# Patient Record
Sex: FEMALE | Race: WHITE | Employment: OTHER | URBAN - METROPOLITAN AREA
[De-identification: names, ages, dates, MRNs, and addresses within clinical notes are randomized per-mention and may not be internally consistent; named-entity substitution may affect disease eponyms.]

---

## 2017-04-12 ENCOUNTER — HOSPITAL ENCOUNTER (OUTPATIENT)
Dept: MAMMOGRAPHY | Age: 52
Discharge: HOME | End: 2017-04-12
Attending: NURSE PRACTITIONER | Admitting: NURSE PRACTITIONER

## 2017-04-12 DIAGNOSIS — Z12.31 ENCOUNTER FOR SCREENING MAMMOGRAM FOR MALIGNANT NEOPLASM OF BREAST: ICD-10-CM

## 2017-04-24 ENCOUNTER — OFFICE VISIT (OUTPATIENT)
Dept: ORTHOPEDIC SURGERY | Age: 52
End: 2017-04-24

## 2017-04-24 VITALS — BODY MASS INDEX: 42.94 KG/M2 | HEIGHT: 69 IN | WEIGHT: 289.9 LBS

## 2017-04-24 DIAGNOSIS — M25.561 PAIN IN BOTH KNEES, UNSPECIFIED CHRONICITY: Primary | ICD-10-CM

## 2017-04-24 DIAGNOSIS — M17.0 PRIMARY OSTEOARTHRITIS OF BOTH KNEES: ICD-10-CM

## 2017-04-24 DIAGNOSIS — M25.562 PAIN IN BOTH KNEES, UNSPECIFIED CHRONICITY: Primary | ICD-10-CM

## 2017-04-24 PROCEDURE — 73562 X-RAY EXAM OF KNEE 3: CPT | Performed by: PHYSICIAN ASSISTANT

## 2017-04-24 PROCEDURE — 20611 DRAIN/INJ JOINT/BURSA W/US: CPT | Performed by: PHYSICIAN ASSISTANT

## 2017-04-24 PROCEDURE — G8427 DOCREV CUR MEDS BY ELIG CLIN: HCPCS | Performed by: PHYSICIAN ASSISTANT

## 2017-04-24 PROCEDURE — 99213 OFFICE O/P EST LOW 20 MIN: CPT | Performed by: PHYSICIAN ASSISTANT

## 2017-04-24 PROCEDURE — 1036F TOBACCO NON-USER: CPT | Performed by: PHYSICIAN ASSISTANT

## 2017-04-24 PROCEDURE — G8417 CALC BMI ABV UP PARAM F/U: HCPCS | Performed by: PHYSICIAN ASSISTANT

## 2017-04-24 PROCEDURE — 3014F SCREEN MAMMO DOC REV: CPT | Performed by: PHYSICIAN ASSISTANT

## 2017-05-11 ENCOUNTER — TELEPHONE (OUTPATIENT)
Dept: ORTHOPEDIC SURGERY | Age: 52
End: 2017-05-11

## 2017-05-12 ENCOUNTER — TELEPHONE (OUTPATIENT)
Dept: ORTHOPEDIC SURGERY | Age: 52
End: 2017-05-12

## 2017-06-01 ENCOUNTER — HOSPITAL ENCOUNTER (OUTPATIENT)
Dept: SURGERY | Age: 52
Discharge: HOME | End: 2017-06-01
Attending: INTERNAL MEDICINE | Admitting: INTERNAL MEDICINE

## 2017-06-01 VITALS
HEIGHT: 68 IN | DIASTOLIC BLOOD PRESSURE: 78 MMHG | TEMPERATURE: 97.5 F | BODY MASS INDEX: 43.04 KG/M2 | SYSTOLIC BLOOD PRESSURE: 132 MMHG | RESPIRATION RATE: 16 BRPM | OXYGEN SATURATION: 98 % | WEIGHT: 284 LBS | HEART RATE: 67 BPM

## 2017-06-01 DIAGNOSIS — Z12.11 ENCOUNTER FOR SCREENING FOR MALIGNANT NEOPLASM OF COLON: ICD-10-CM

## 2017-06-01 LAB — PREGNANCY, URINE: NEGATIVE

## 2017-06-01 RX ORDER — M-VIT,TX,IRON,MINS/CALC/FOLIC 27MG-0.4MG
1 TABLET ORAL DAILY
COMMUNITY

## 2017-06-01 RX ORDER — LIDOCAINE HYDROCHLORIDE 10 MG/ML
0.1 INJECTION, SOLUTION EPIDURAL; INFILTRATION; INTRACAUDAL; PERINEURAL
Status: ACTIVE | OUTPATIENT
Start: 2017-06-01 | End: 2017-06-01

## 2017-06-01 RX ORDER — SODIUM CHLORIDE, SODIUM LACTATE, POTASSIUM CHLORIDE, CALCIUM CHLORIDE 600; 310; 30; 20 MG/100ML; MG/100ML; MG/100ML; MG/100ML
INJECTION, SOLUTION INTRAVENOUS ONCE
Status: COMPLETED | OUTPATIENT
Start: 2017-06-01 | End: 2017-06-01

## 2017-06-01 RX ADMIN — SODIUM CHLORIDE, SODIUM LACTATE, POTASSIUM CHLORIDE, CALCIUM CHLORIDE: 600; 310; 30; 20 INJECTION, SOLUTION INTRAVENOUS at 13:13

## 2017-06-01 ASSESSMENT — PAIN SCALES - GENERAL
PAINLEVEL_OUTOF10: 0

## 2017-06-01 ASSESSMENT — PAIN DESCRIPTION - DESCRIPTORS: DESCRIPTORS: ACHING;SORE

## 2017-06-01 ASSESSMENT — PAIN - FUNCTIONAL ASSESSMENT: PAIN_FUNCTIONAL_ASSESSMENT: 0-10

## 2017-07-10 ENCOUNTER — OFFICE VISIT (OUTPATIENT)
Dept: ORTHOPEDIC SURGERY | Age: 52
End: 2017-07-10

## 2017-07-10 DIAGNOSIS — M17.0 PRIMARY OSTEOARTHRITIS OF BOTH KNEES: Primary | ICD-10-CM

## 2017-07-10 PROCEDURE — 99999 PR OFFICE/OUTPT VISIT,PROCEDURE ONLY: CPT | Performed by: PHYSICIAN ASSISTANT

## 2017-07-10 PROCEDURE — 20611 DRAIN/INJ JOINT/BURSA W/US: CPT | Performed by: PHYSICIAN ASSISTANT

## 2017-07-10 PROCEDURE — 1036F TOBACCO NON-USER: CPT | Performed by: PHYSICIAN ASSISTANT

## 2017-07-17 ENCOUNTER — OFFICE VISIT (OUTPATIENT)
Dept: ORTHOPEDIC SURGERY | Age: 52
End: 2017-07-17

## 2017-07-17 DIAGNOSIS — M17.0 PRIMARY OSTEOARTHRITIS OF BOTH KNEES: Primary | ICD-10-CM

## 2017-07-17 PROCEDURE — 20611 DRAIN/INJ JOINT/BURSA W/US: CPT | Performed by: PHYSICIAN ASSISTANT

## 2017-07-17 PROCEDURE — 1036F TOBACCO NON-USER: CPT | Performed by: PHYSICIAN ASSISTANT

## 2017-07-17 PROCEDURE — 99999 PR OFFICE/OUTPT VISIT,PROCEDURE ONLY: CPT | Performed by: PHYSICIAN ASSISTANT

## 2017-07-24 ENCOUNTER — OFFICE VISIT (OUTPATIENT)
Dept: ORTHOPEDIC SURGERY | Age: 52
End: 2017-07-24

## 2017-07-24 DIAGNOSIS — M17.0 PRIMARY OSTEOARTHRITIS OF BOTH KNEES: Primary | ICD-10-CM

## 2017-07-24 PROCEDURE — 99999 PR OFFICE/OUTPT VISIT,PROCEDURE ONLY: CPT | Performed by: PHYSICIAN ASSISTANT

## 2017-07-24 PROCEDURE — 1036F TOBACCO NON-USER: CPT | Performed by: PHYSICIAN ASSISTANT

## 2017-07-24 PROCEDURE — 20611 DRAIN/INJ JOINT/BURSA W/US: CPT | Performed by: PHYSICIAN ASSISTANT

## 2018-01-29 ENCOUNTER — OFFICE VISIT (OUTPATIENT)
Dept: ORTHOPEDIC SURGERY | Age: 53
End: 2018-01-29

## 2018-01-29 VITALS — WEIGHT: 283.95 LBS | BODY MASS INDEX: 43.04 KG/M2 | HEIGHT: 68 IN

## 2018-01-29 DIAGNOSIS — M17.0 BILATERAL PRIMARY OSTEOARTHRITIS OF KNEE: Primary | ICD-10-CM

## 2018-01-29 PROCEDURE — 3017F COLORECTAL CA SCREEN DOC REV: CPT | Performed by: ORTHOPAEDIC SURGERY

## 2018-01-29 PROCEDURE — G8427 DOCREV CUR MEDS BY ELIG CLIN: HCPCS | Performed by: ORTHOPAEDIC SURGERY

## 2018-01-29 PROCEDURE — 1036F TOBACCO NON-USER: CPT | Performed by: ORTHOPAEDIC SURGERY

## 2018-01-29 PROCEDURE — 3014F SCREEN MAMMO DOC REV: CPT | Performed by: ORTHOPAEDIC SURGERY

## 2018-01-29 PROCEDURE — G8484 FLU IMMUNIZE NO ADMIN: HCPCS | Performed by: ORTHOPAEDIC SURGERY

## 2018-01-29 PROCEDURE — G8417 CALC BMI ABV UP PARAM F/U: HCPCS | Performed by: ORTHOPAEDIC SURGERY

## 2018-01-29 PROCEDURE — 99213 OFFICE O/P EST LOW 20 MIN: CPT | Performed by: ORTHOPAEDIC SURGERY

## 2018-01-29 NOTE — PROGRESS NOTES
lungs every morning, Disp: , Rfl:     IRON, FERROUS SULFATE, PO, Take 1 tablet by mouth daily, Disp: , Rfl:     Multiple Vitamins-Minerals (THERAPEUTIC MULTIVITAMIN-MINERALS) tablet, Take 1 tablet by mouth daily, Disp: , Rfl:     Probiotic Product (PROBIOTIC DAILY PO), Take 1 tablet by mouth daily, Disp: , Rfl:     albuterol sulfate  (90 BASE) MCG/ACT inhaler, Inhale 2 puffs into the lungs, Disp: , Rfl:     Vitamin D (CHOLECALCIFEROL) 1000 UNITS CAPS capsule, Take 1,000 Units by mouth daily. , Disp: , Rfl:     ibuprofen (ADVIL;MOTRIN) 600 MG tablet, Take 600 mg by mouth every 6 hours as needed for Pain., Disp: , Rfl:     cloNIDine (CATAPRES) 0.2 MG tablet, Take 0.5 tablets by mouth 2 times daily. (Patient taking differently: Take  by mouth 2 times daily.), Disp: 90 tablet, Rfl: 0    Topiramate (TOPAMAX PO), Take by mouth Take 75 mg in am and 25 in pm, Disp: , Rfl:     montelukast (SINGULAIR) 10 MG tablet, Take 10 mg by mouth nightly., Disp: , Rfl:     Fexofenadine HCl (ALLEGRA PO), Take 60 mg by mouth daily as needed , Disp: , Rfl:     triamterene-hydrochlorothiazide (MAXZIDE) 75-50 MG per tablet, Take 1 tablet by mouth daily. Taking 1/2 tab daily  , Disp: , Rfl:     ALPRAZolam (XANAX) 0.25 MG tablet, Take 0.5 mg by mouth nightly as needed. , Disp: , Rfl:     cyclobenzaprine (FLEXERIL) 10 MG tablet, Take 5 mg by mouth nightly as needed. , Disp: , Rfl:     fluticasone (FLONASE) 50 MCG/ACT nasal spray, 1 spray by Nasal route daily. , Disp: , Rfl:     vitamin B-12 (CYANOCOBALAMIN) 100 MCG tablet, Take 50 mcg by mouth daily. , Disp: , Rfl:     FLUoxetine HCl (PROZAC PO), Take 80 mg by mouth daily. , Disp: , Rfl:     Levothyroxine Sodium (LEVOXYL PO), Take 50 mcg by mouth daily. Unknown dose, Disp: , Rfl:     albuterol (PROVENTIL HFA;VENTOLIN HFA) 108 (90 BASE) MCG/ACT inhaler, Inhale 2 puffs into the lungs every 6 hours as needed. , Disp: , Rfl:   Allergies:  Ace inhibitors;  Amlodipine;

## 2018-02-13 ENCOUNTER — TELEPHONE (OUTPATIENT)
Dept: ORTHOPEDIC SURGERY | Age: 53
End: 2018-02-13

## 2018-02-14 ENCOUNTER — NURSE ONLY (OUTPATIENT)
Dept: ORTHOPEDIC SURGERY | Age: 53
End: 2018-02-14

## 2018-02-14 DIAGNOSIS — M17.0 PRIMARY OSTEOARTHRITIS OF BOTH KNEES: Primary | ICD-10-CM

## 2018-02-14 PROCEDURE — 20611 DRAIN/INJ JOINT/BURSA W/US: CPT | Performed by: PHYSICIAN ASSISTANT

## 2018-02-14 PROCEDURE — 99999 PR OFFICE/OUTPT VISIT,PROCEDURE ONLY: CPT | Performed by: PHYSICIAN ASSISTANT

## 2018-02-14 NOTE — PROGRESS NOTES
Euflexxa #1 bilateral knee    The patient is symptomatic from bilateral knee osteoarthritis of the knee joint with documented radiological signs of osteoarthritis. The patient has also failed 3 months of conservative treatment including home exercise, education, Tylenol and/or NSAIDs use. The patient was offered a Visco supplementation today. Risks, benefits, and alternatives to the injections were discussed in detail with the patient. The risks discussed included but are not limited to infection, skin reactions, hot swollen joints, and anaphylaxis. The patient gave verbal informed consent for the injection. The patient's skin was prepped with  3 sterile gauze  pads soaked with alcohol solution and the knee joint was injected with 2 mL Euflexxa to the Right and LEFT knee intra-articularly under sterile conditions. Technique: Under sterile conditions a Son[employer]ia ultrasound unit with a variable frequency (6.0-15.0 MHz) linear transducer was used to localize the placement of a 22-gauge needle into the knee joint. Findings: Successful needle placement for intra-articular Visco supplementation injection. Final images were taken and saved for permanent record. The patient tolerated the injection reasonably well. The patient was given instructions to ice the kne and avoid strenuous activities for 24-48 hours. The patient was instructed to call the office immediately if there is increased pain, redness, warmth, fever, or chills. We will see the patient back in one week for their second injection.

## 2018-02-21 ENCOUNTER — OFFICE VISIT (OUTPATIENT)
Dept: ORTHOPEDIC SURGERY | Age: 53
End: 2018-02-21

## 2018-02-21 DIAGNOSIS — M17.0 PRIMARY OSTEOARTHRITIS OF BOTH KNEES: Primary | ICD-10-CM

## 2018-02-21 PROCEDURE — 99999 PR OFFICE/OUTPT VISIT,PROCEDURE ONLY: CPT | Performed by: PHYSICIAN ASSISTANT

## 2018-02-21 PROCEDURE — 20611 DRAIN/INJ JOINT/BURSA W/US: CPT | Performed by: PHYSICIAN ASSISTANT

## 2018-02-21 NOTE — PROGRESS NOTES
SITE: #2 Warren Shea LEFT KNEE    MLE:[id number]    [facility]#:[id number]    EXP:12/2018

## 2018-02-28 ENCOUNTER — OFFICE VISIT (OUTPATIENT)
Dept: ORTHOPEDIC SURGERY | Age: 53
End: 2018-02-28

## 2018-02-28 DIAGNOSIS — M17.0 PRIMARY OSTEOARTHRITIS OF BOTH KNEES: Primary | ICD-10-CM

## 2018-02-28 PROCEDURE — 99999 PR OFFICE/OUTPT VISIT,PROCEDURE ONLY: CPT | Performed by: PHYSICIAN ASSISTANT

## 2018-02-28 PROCEDURE — 20611 DRAIN/INJ JOINT/BURSA W/US: CPT | Performed by: PHYSICIAN ASSISTANT

## 2018-02-28 NOTE — PROGRESS NOTES
Ayde Cao #3    [facility]: [id number]    LOT #: [id number]    EXP: 12/10/18    SITE: right and left knee

## 2018-04-13 ENCOUNTER — HOSPITAL ENCOUNTER (OUTPATIENT)
Dept: MAMMOGRAPHY | Age: 53
Discharge: HOME | End: 2018-04-13
Attending: OBSTETRICS & GYNECOLOGY | Admitting: OBSTETRICS & GYNECOLOGY

## 2018-04-13 DIAGNOSIS — Z12.31 ENCOUNTER FOR SCREENING MAMMOGRAM FOR BREAST CANCER: ICD-10-CM

## 2018-07-24 DIAGNOSIS — M17.0 PRIMARY OSTEOARTHRITIS OF BOTH KNEES: Primary | ICD-10-CM

## 2018-08-07 ENCOUNTER — TELEPHONE (OUTPATIENT)
Dept: ORTHOPEDIC SURGERY | Age: 53
End: 2018-08-07

## 2018-08-28 ENCOUNTER — NURSE ONLY (OUTPATIENT)
Dept: ORTHOPEDIC SURGERY | Age: 53
End: 2018-08-28

## 2018-08-28 DIAGNOSIS — M17.0 PRIMARY OSTEOARTHRITIS OF BOTH KNEES: Primary | ICD-10-CM

## 2018-08-28 PROCEDURE — 20611 DRAIN/INJ JOINT/BURSA W/US: CPT | Performed by: PHYSICIAN ASSISTANT

## 2018-09-05 ENCOUNTER — OFFICE VISIT (OUTPATIENT)
Dept: ORTHOPEDIC SURGERY | Age: 53
End: 2018-09-05

## 2018-09-05 DIAGNOSIS — M17.0 PRIMARY OSTEOARTHRITIS OF BOTH KNEES: Primary | ICD-10-CM

## 2018-09-05 PROCEDURE — 20611 DRAIN/INJ JOINT/BURSA W/US: CPT | Performed by: PHYSICIAN ASSISTANT

## 2018-09-05 NOTE — PROGRESS NOTES
Euflexxa #2 bilateral knee  Osteoarthritis bilateral knee    The patient returns today for their second injection for the treatment of Right and LEFT  knee osteoarthritis. The risks, benefits, and complications of the injections were again discussed in detail with the patient. The risks discussed included but are not limited to infection, skin reactions, hot swollen joints, and anaphylaxis. The patient gave verbal informed consent for the injection. The patient skin was prepped with 3 sterile gauze pads soaked with alcohol solution and the knee joint was injected with 2cc Euflexxa Right and Left knee intra-articularly under sterile conditions . Technique: Under sterile conditions a S[employer] ultrasound unit with a variable frequency (6.0-15.0 MHz) linear transducer was used to localize the placement of a 22-gauge needle into the knee joint. Findings: Successful needle placement for intra-articular Visco supplementation injection. Final images were taken and saved for permanent record. The patient tolerated the injection reasonably well. The patient was given instructions to ice the the knee and avoid strenuous activities for 24-48 hours. The patient was instructed to call the office immediately if there is increased pain, redness, warmth, fever, or chills. We will see the patient back in one week for the third injection.

## 2018-09-12 ENCOUNTER — NURSE ONLY (OUTPATIENT)
Dept: ORTHOPEDIC SURGERY | Age: 53
End: 2018-09-12

## 2018-09-12 DIAGNOSIS — M17.0 PRIMARY OSTEOARTHRITIS OF BOTH KNEES: Primary | ICD-10-CM

## 2018-09-12 NOTE — PROGRESS NOTES
Chief Complaint   Patient presents with    Knee Pain     #3 EUFLEXXA ZAIRA KNEES     The patient returns today for their third and final right and left knee injection for osteoarthritis. The risks, benefits, and complications of the injections were again discussed in detail with the patient. The risks discussed include but are not limited to infection, skin reactions, hot swollen joints, and anaphylaxis. The patient gave verbal informed consent for the injection. The patient's skin was prepped with 3 sterile gauze pads soaked with alcohol solution and the knee joint was injected with 2cc Euflexxa intra-articularly under sterile conditions. Technique: Under sterile conditions a Son[employer] Inside ultrasound unit with a variable frequency (6.0-15.0 MHz) linear transducer was used to localize the placement of a 22-gauge needle into the right and left knee joint. Findings: Successful needle placement for intra-articular Visco supplementation injection. Final images were taken and saved for permanent record. The patient tolerated the injection reasonably well. The patient was given instructions to ice of the knee and avoid strenuous activity for 24-48 hours. The patient was instructed to call the office immediately if there is increased pain, redness, warmth, fever, or chills. We will see the patient back on an as-needed basis  from this point. Renu Estrada [facility]    This dictation was performed with a verbal recognition program [facility]) and it was checked for errors. It is possible that there are still dictated errors within this office note. If so, please bring any errors to my attention for an addendum. All efforts were made to ensure that this office note is accurate.

## 2018-10-10 ENCOUNTER — OFFICE VISIT (OUTPATIENT)
Dept: ORTHOPEDIC SURGERY | Age: 53
End: 2018-10-10
Payer: COMMERCIAL

## 2018-10-10 VITALS — WEIGHT: 283.95 LBS | HEIGHT: 68 IN | BODY MASS INDEX: 43.04 KG/M2

## 2018-10-10 DIAGNOSIS — M25.512 LEFT SHOULDER PAIN, UNSPECIFIED CHRONICITY: Primary | ICD-10-CM

## 2018-10-10 DIAGNOSIS — M75.82 ROTATOR CUFF TENDONITIS, LEFT: ICD-10-CM

## 2018-10-10 PROCEDURE — 3017F COLORECTAL CA SCREEN DOC REV: CPT | Performed by: PHYSICIAN ASSISTANT

## 2018-10-10 PROCEDURE — 1036F TOBACCO NON-USER: CPT | Performed by: PHYSICIAN ASSISTANT

## 2018-10-10 PROCEDURE — G8417 CALC BMI ABV UP PARAM F/U: HCPCS | Performed by: PHYSICIAN ASSISTANT

## 2018-10-10 PROCEDURE — 20611 DRAIN/INJ JOINT/BURSA W/US: CPT | Performed by: PHYSICIAN ASSISTANT

## 2018-10-10 PROCEDURE — G8484 FLU IMMUNIZE NO ADMIN: HCPCS | Performed by: PHYSICIAN ASSISTANT

## 2018-10-10 PROCEDURE — 99214 OFFICE O/P EST MOD 30 MIN: CPT | Performed by: PHYSICIAN ASSISTANT

## 2018-10-10 PROCEDURE — G8427 DOCREV CUR MEDS BY ELIG CLIN: HCPCS | Performed by: PHYSICIAN ASSISTANT

## 2019-01-28 DIAGNOSIS — M17.0 PRIMARY OSTEOARTHRITIS OF BOTH KNEES: Primary | ICD-10-CM

## 2019-02-04 ENCOUNTER — OFFICE VISIT (OUTPATIENT)
Dept: ORTHOPEDIC SURGERY | Age: 54
End: 2019-02-04
Payer: COMMERCIAL

## 2019-02-04 DIAGNOSIS — M17.0 PRIMARY OSTEOARTHRITIS OF BOTH KNEES: Primary | ICD-10-CM

## 2019-02-15 ENCOUNTER — TELEPHONE (OUTPATIENT)
Dept: ORTHOPEDIC SURGERY | Age: 54
End: 2019-02-15

## 2019-02-19 ENCOUNTER — TELEPHONE (OUTPATIENT)
Dept: ORTHOPEDIC SURGERY | Age: 54
End: 2019-02-19

## 2019-03-08 ENCOUNTER — TELEPHONE (OUTPATIENT)
Dept: ORTHOPEDIC SURGERY | Age: 54
End: 2019-03-08

## 2019-03-19 ENCOUNTER — NURSE ONLY (OUTPATIENT)
Dept: ORTHOPEDIC SURGERY | Age: 54
End: 2019-03-19
Payer: COMMERCIAL

## 2019-03-19 DIAGNOSIS — M17.0 PRIMARY OSTEOARTHRITIS OF BOTH KNEES: Primary | ICD-10-CM

## 2019-03-19 PROCEDURE — 20610 DRAIN/INJ JOINT/BURSA W/O US: CPT | Performed by: PHYSICIAN ASSISTANT

## 2019-03-19 PROCEDURE — 99213 OFFICE O/P EST LOW 20 MIN: CPT | Performed by: PHYSICIAN ASSISTANT

## 2019-03-26 ENCOUNTER — NURSE ONLY (OUTPATIENT)
Dept: ORTHOPEDIC SURGERY | Age: 54
End: 2019-03-26
Payer: COMMERCIAL

## 2019-03-26 DIAGNOSIS — M17.0 PRIMARY OSTEOARTHRITIS OF BOTH KNEES: Primary | ICD-10-CM

## 2019-04-02 ENCOUNTER — NURSE ONLY (OUTPATIENT)
Dept: ORTHOPEDIC SURGERY | Age: 54
End: 2019-04-02
Payer: COMMERCIAL

## 2019-04-02 DIAGNOSIS — M17.0 PRIMARY OSTEOARTHRITIS OF BOTH KNEES: Primary | ICD-10-CM

## 2019-04-02 PROCEDURE — 99999 PR OFFICE/OUTPT VISIT,PROCEDURE ONLY: CPT | Performed by: PHYSICIAN ASSISTANT

## 2019-04-15 ENCOUNTER — HOSPITAL ENCOUNTER (OUTPATIENT)
Dept: WOMENS IMAGING | Age: 54
Discharge: HOME | End: 2019-04-15
Payer: COMMERCIAL

## 2019-04-15 DIAGNOSIS — Z12.31 ENCOUNTER FOR SCREENING MAMMOGRAM FOR BREAST CANCER: ICD-10-CM

## 2019-04-15 PROCEDURE — 77067 SCR MAMMO BI INCL CAD: CPT

## 2019-07-08 ENCOUNTER — OFFICE VISIT (OUTPATIENT)
Dept: ORTHOPEDIC SURGERY | Age: 54
End: 2019-07-08
Payer: MEDICARE

## 2019-07-08 VITALS — BODY MASS INDEX: 43.04 KG/M2 | HEIGHT: 68 IN | WEIGHT: 283.95 LBS

## 2019-07-08 DIAGNOSIS — M75.82 ROTATOR CUFF TENDONITIS, LEFT: Primary | ICD-10-CM

## 2019-07-08 PROCEDURE — 99213 OFFICE O/P EST LOW 20 MIN: CPT | Performed by: PHYSICIAN ASSISTANT

## 2019-07-08 NOTE — PROGRESS NOTES
Examinations:        Right Upper Extremity:  Examination of the right upper extremity does not show any tenderness, deformity or injury. Range of motion is unremarkable. There is no gross instability. There are no rashes, ulcerations or lesions. Strength and tone are normal.        Orders     Orders Placed This Encounter   Procedures    MRI Shoulder Left WO Contrast     Standing Status:   Future     Standing Expiration Date:   7/7/2020     Scheduling Instructions:      [facility]     Order Specific Question:   Reason for exam:     Answer:   r/o RTC tear         Assessment / Treatment Plan:     1. Left shoulder rotator cuff tendinitis versus tear      Given her persistent symptoms and failure to improve with a cortisone injection and physical therapy, I would like to obtain a MRI of the left shoulder to rule out partial or localized full-thickness tearing of the rotator cuff. She will return to the clinic to review the MRI results. I discussed with her we may need to pursue arthroscopic surgery. We will discuss this further when she returns to review the MRI results. I spent 15 minutes face-to-face with the patient and greater than 50% of that time was spent counseling/coordinating care for the above-stated diagnosis      Kateryna Webster, [facility]    This dictation was performed with a verbal recognition program ([employer]) and it was checked for errors. It is possible that there are still dictated errors within this office note. If so, please bring any errors to my attention for an addendum. All efforts were made to ensure that this office note is accurate.

## 2019-07-09 ENCOUNTER — TELEPHONE (OUTPATIENT)
Dept: ORTHOPEDIC SURGERY | Age: 54
End: 2019-07-09

## 2019-07-24 ENCOUNTER — OFFICE VISIT (OUTPATIENT)
Dept: ORTHOPEDIC SURGERY | Age: 54
End: 2019-07-24
Payer: MEDICARE

## 2019-07-24 VITALS — BODY MASS INDEX: 43.04 KG/M2 | HEIGHT: 68 IN | WEIGHT: 283.95 LBS

## 2019-07-24 DIAGNOSIS — M75.82 ROTATOR CUFF TENDONITIS, LEFT: Primary | ICD-10-CM

## 2019-07-24 PROCEDURE — 99213 OFFICE O/P EST LOW 20 MIN: CPT | Performed by: ORTHOPAEDIC SURGERY

## 2019-08-12 ENCOUNTER — TELEPHONE (OUTPATIENT)
Dept: BARIATRICS/WEIGHT MGMT | Age: 54
End: 2019-08-12

## 2019-08-12 NOTE — TELEPHONE ENCOUNTER
JENNIFER for patient to call back. Patient attended seminar 8/8/19 with Dr. Magali Enrique. She DOES  have BWLS coverage with Medicare/[employer] Medicare (No Req Diet) BMI Form scanned in media.  Thanks

## 2019-08-20 ENCOUNTER — TELEPHONE (OUTPATIENT)
Dept: BARIATRICS/WEIGHT MGMT | Age: 54
End: 2019-08-20

## 2019-08-27 ENCOUNTER — OFFICE VISIT (OUTPATIENT)
Dept: BARIATRICS/WEIGHT MGMT | Age: 54
End: 2019-08-27
Payer: MEDICARE

## 2019-08-27 VITALS
DIASTOLIC BLOOD PRESSURE: 77 MMHG | RESPIRATION RATE: 18 BRPM | HEART RATE: 87 BPM | BODY MASS INDEX: 44.41 KG/M2 | SYSTOLIC BLOOD PRESSURE: 116 MMHG | HEIGHT: 68 IN | WEIGHT: 293 LBS | OXYGEN SATURATION: 98 %

## 2019-08-27 DIAGNOSIS — E78.2 MIXED HYPERLIPIDEMIA: ICD-10-CM

## 2019-08-27 DIAGNOSIS — Z98.84 STATUS POST GASTRIC BANDING: ICD-10-CM

## 2019-08-27 DIAGNOSIS — Z01.818 PRE-OPERATIVE CLEARANCE: ICD-10-CM

## 2019-08-27 DIAGNOSIS — E66.01 MORBID OBESITY WITH BMI OF 45.0-49.9, ADULT (HCC): Primary | ICD-10-CM

## 2019-08-27 DIAGNOSIS — I10 ESSENTIAL HYPERTENSION: ICD-10-CM

## 2019-08-27 PROCEDURE — 99204 OFFICE O/P NEW MOD 45 MIN: CPT | Performed by: SURGERY

## 2019-08-27 RX ORDER — CLONIDINE HYDROCHLORIDE 0.1 MG/1
0.1 TABLET ORAL 2 TIMES DAILY
COMMUNITY

## 2019-08-27 RX ORDER — LIOTHYRONINE SODIUM 5 UG/1
20 TABLET ORAL DAILY
COMMUNITY
End: 2020-12-22 | Stop reason: ALTCHOICE

## 2019-08-27 RX ORDER — BUSPIRONE HYDROCHLORIDE 15 MG/1
15 TABLET ORAL NIGHTLY
COMMUNITY

## 2019-08-27 RX ORDER — ACETAMINOPHEN 500 MG
1000 TABLET ORAL DAILY
COMMUNITY

## 2019-08-27 NOTE — PROGRESS NOTES
and lids are normal. Pupils are equal, round, and reactive to light. Right eye exhibits no discharge. No foreign body present in the right eye. Left eye exhibits no discharge. No foreign body present in the left eye. No scleral icterus. Neck: Trachea normal and normal range of motion. Neck supple. No JVD present. No tracheal tenderness present. Carotid bruit is not present. No rigidity. No tracheal deviation and no edema present. No thyromegaly present. Cardiovascular: Normal rate, regular rhythm, normal heart sounds, intact distal pulses and normal pulses. Pulmonary/Chest: Effort normal and breath sounds normal. No stridor. No respiratory distress. Patient  has no wheezes. Patient has no rales. Patient exhibits no tenderness and no crepitus. Abdominal: Soft. Normal appearance and bowel sounds are normal. Patient exhibits no distension, no abdominal bruit, no ascites and no mass. There is no hepatosplenomegaly. There is no tenderness. There is no rigidity, no rebound, no guarding and no CVA tenderness. No hernia. Hernia confirmed negative in the ventral area. Musculoskeletal: Normal range of motion. Patient exhibits no edema or tenderness. Lymphadenopathy:        Head (right side): No submental, no submandibular, no preauricular, no posterior auricular and no occipital adenopathy present. Head (left side): No submental, no submandibular, no preauricular, no posterior auricular and no occipital adenopathy present. Patient  has no cervical adenopathy. Right: No supraclavicular adenopathy present. Left: No supraclavicular adenopathy present. Neurological: Patient is alert and oriented to person, place, and time. Patient has normal strength. Coordination and gait normal. GCS eye subscore is 4. GCS verbal subscore is 5. GCS motor subscore is 6. Skin: Skin is warm and dry. No abrasion and no rash noted. Patient  is not diaphoretic. No cyanosis or erythema.    Psychiatric: Patient has

## 2019-08-27 NOTE — PROGRESS NOTES
Name_______________________________________Printed:____________________  Date and time of surgery___9/13/19  0800_____________________Arrival Time:__0600 hosp-endo______________   1. Do not eat or drink anything after 12 midnight (or____hours) prior to surgery. This includes no water, chewing gum or mints. Endoscopy patients follow your doctors bowel prep instructions,which may include taking part of prep after midnight. 2. Take the following pills with a small sip of water on the morning of surgery____inhaler prn, clonidine, levothyroxine, cytomel_______________________________________________                  Do not take blood pressure medications ending in pril or sartan the chalo prior to surgery or the morning of surgery_   3. Aspirin, Ibuprofen, Advil, Naproxen, Vitamin E and other Anti-inflammatory products should be stopped for 5 days before surgery or as directed by your physician. 4. Check with your Doctor regarding stopping Plavix, Coumadin,Eliquis, Lovenox,Effient,Pradaxa,Xarelto, Fragmin or other blood thinners and follow their instructions. 5. Do not smoke, and do not drink any alcoholic beverages 24 hours prior to surgery. This includes NA Beer. Refrain from the usage of any recreational drugs. 6. You may brush your teeth and gargle the morning of surgery. DO NOT SWALLOW WATER   7. You MUST make arrangements for a responsible adult to stay on site while you are here and take you home after your surgery. You will not be allowed to leave alone or drive yourself home. It is strongly suggested someone stay with you the first 24 hrs. Your surgery will be cancelled if you do not have a ride home. 8. A parent/legal guardian must accompany a child scheduled for surgery and plan to stay at the hospital until the child is discharged. Please do not bring other children with you.    9. Please wear simple, loose fitting clothing to the hospital.  Do not bring valuables (money, credit cards, checkbooks, etc.) Do not wear any makeup (including no eye makeup) or nail polish on your fingers or toes. 10. DO NOT wear any jewelry or piercings on day of surgery. All body piercing jewelry must be removed. 11. If you have ___dentures, they will be removed before going to the OR; we will provide you a container. If you wear ___contact lenses or ___glasses, they will be removed; please bring a case for them. 12. Please see your family doctor/pediatrician for a history & physical and/or concerning medications. Bring any test results/reports from your physician's office. PCP__________________Phone___________H&P Appt. Date________             13 If you  have a Living Will and Durable Power of  for Healthcare, please bring in a copy. 15. Notify your Surgeon if you develop any illness between now and surgery  time, cough, cold, fever, sore throat, nausea, vomiting, etc.  Please notify your surgeon if you experience dizziness, shortness of breath or blurred vision between now & the time of your surgery             15. DO NOT shave your operative site 96 hours prior to surgery. For face & neck surgery, men may use an electric razor 48 hours prior to surgery. 16. Shower the night before surgery with ___Antibacterial soap ___Hibiclens             17. To provide excellent care visitors will be limited to one in the room at any given time. 18.  Please bring picture ID and insurance card. 19.  Visit our web site for additional information:  [email or web address]              20.During flu season no children under the age of 15 are permitted in the hospital for the safety of all patients.                               21. If you take a long acting insulin in the evening only  take half of your usual  dose the night  before your procedure              22. If you use a c-pap please bring DOS if staying overnight,             23.For your [facility] has a pharmacy on site to fill your prescriptions. 24. If you use oxygen and have a portable tank please bring it  with you the DOS             25. Bring a complete list of all your medications with name and dose include any supplements. 26. Other__________________________________________   *Please call pre admission testing if you any further questions   Lacey Selby [address]    [id number] [facility]  [phone]   [address] [place] [address]       All above information reviewed with patient in person or by phone. Patient verbalizes understanding. All questions and concerns addressed.                                                                                                  Patient/Rep____________________                                                                                                                                    PRE OP INSTRUCTIONS

## 2019-08-28 ENCOUNTER — ANESTHESIA EVENT (OUTPATIENT)
Dept: ENDOSCOPY | Age: 54
End: 2019-08-28
Payer: MEDICARE

## 2019-09-04 ENCOUNTER — HOSPITAL ENCOUNTER (OUTPATIENT)
Dept: GENERAL RADIOLOGY | Age: 54
Discharge: HOME | End: 2019-09-04
Payer: MEDICARE

## 2019-09-04 ENCOUNTER — HOSPITAL ENCOUNTER (OUTPATIENT)
Age: 54
Discharge: HOME | End: 2019-09-04
Payer: MEDICARE

## 2019-09-04 DIAGNOSIS — I10 ESSENTIAL HYPERTENSION: ICD-10-CM

## 2019-09-04 DIAGNOSIS — Z98.84 STATUS POST GASTRIC BANDING: ICD-10-CM

## 2019-09-04 DIAGNOSIS — E78.2 MIXED HYPERLIPIDEMIA: ICD-10-CM

## 2019-09-04 DIAGNOSIS — E66.01 MORBID OBESITY WITH BMI OF 45.0-49.9, ADULT (HCC): ICD-10-CM

## 2019-09-04 DIAGNOSIS — Z98.84 BARIATRIC SURGERY STATUS: ICD-10-CM

## 2019-09-04 DIAGNOSIS — Z01.818 PRE-OPERATIVE CLEARANCE: ICD-10-CM

## 2019-09-04 LAB
A/G RATIO: 1.1 (ref 1.1–2.2)
ALBUMIN SERPL-MCNC: 4.1 G/DL (ref 3.4–5)
ALP BLD-CCNC: 134 U/L (ref 40–129)
ALT SERPL-CCNC: 15 U/L (ref 10–40)
ANION GAP SERPL CALCULATED.3IONS-SCNC: 16 MMOL/L (ref 3–16)
AST SERPL-CCNC: 27 U/L (ref 15–37)
BASOPHILS ABSOLUTE: 0.1 K/UL (ref 0–0.2)
BASOPHILS RELATIVE PERCENT: 1.9 %
BILIRUB SERPL-MCNC: 0.4 MG/DL (ref 0–1)
BUN BLDV-MCNC: 21 MG/DL (ref 7–20)
CALCIUM SERPL-MCNC: 9.7 MG/DL (ref 8.3–10.6)
CHLORIDE BLD-SCNC: 102 MMOL/L (ref 99–110)
CHOLESTEROL, TOTAL: 213 MG/DL (ref 0–199)
CO2: 23 MMOL/L (ref 21–32)
CREAT SERPL-MCNC: 1.1 MG/DL (ref 0.6–1.1)
EOSINOPHILS ABSOLUTE: 0.2 K/UL (ref 0–0.6)
EOSINOPHILS RELATIVE PERCENT: 2.4 %
FOLATE: 15.4 NG/ML (ref 4.78–24.2)
GFR AFRICAN AMERICAN: >60
GFR NON-AFRICAN AMERICAN: 52
GLOBULIN: 3.9 G/DL
GLUCOSE BLD-MCNC: 98 MG/DL (ref 70–99)
HCT VFR BLD CALC: 43.6 % (ref 36–48)
HDLC SERPL-MCNC: 33 MG/DL (ref 40–60)
HEMOGLOBIN: 14.3 G/DL (ref 12–16)
IRON SATURATION: 26 % (ref 15–50)
IRON: 73 UG/DL (ref 37–145)
LDL CHOLESTEROL CALCULATED: 134 MG/DL
LYMPHOCYTES ABSOLUTE: 1.2 K/UL (ref 1–5.1)
LYMPHOCYTES RELATIVE PERCENT: 18.8 %
MCH RBC QN AUTO: 28.5 PG (ref 26–34)
MCHC RBC AUTO-ENTMCNC: 32.7 G/DL (ref 31–36)
MCV RBC AUTO: 87.1 FL (ref 80–100)
MONOCYTES ABSOLUTE: 0.4 K/UL (ref 0–1.3)
MONOCYTES RELATIVE PERCENT: 6 %
NEUTROPHILS ABSOLUTE: 4.7 K/UL (ref 1.7–7.7)
NEUTROPHILS RELATIVE PERCENT: 70.9 %
PDW BLD-RTO: 14.3 % (ref 12.4–15.4)
PLATELET # BLD: 232 K/UL (ref 135–450)
PMV BLD AUTO: 9 FL (ref 5–10.5)
POTASSIUM SERPL-SCNC: 3.7 MMOL/L (ref 3.5–5.1)
RBC # BLD: 5.01 M/UL (ref 4–5.2)
SODIUM BLD-SCNC: 141 MMOL/L (ref 136–145)
TOTAL IRON BINDING CAPACITY: 278 UG/DL (ref 260–445)
TOTAL PROTEIN: 8 G/DL (ref 6.4–8.2)
TRIGL SERPL-MCNC: 228 MG/DL (ref 0–150)
TSH REFLEX: 0.64 UIU/ML (ref 0.27–4.2)
VITAMIN B-12: 945 PG/ML (ref 211–911)
VITAMIN D 25-HYDROXY: 40.7 NG/ML
VLDLC SERPL CALC-MCNC: 46 MG/DL
WBC # BLD: 6.6 K/UL (ref 4–11)

## 2019-09-04 PROCEDURE — 80053 COMPREHEN METABOLIC PANEL: CPT

## 2019-09-04 PROCEDURE — 83540 ASSAY OF IRON: CPT

## 2019-09-04 PROCEDURE — 83036 HEMOGLOBIN GLYCOSYLATED A1C: CPT

## 2019-09-04 PROCEDURE — 84597 ASSAY OF VITAMIN K: CPT

## 2019-09-04 PROCEDURE — 84590 ASSAY OF VITAMIN A: CPT

## 2019-09-04 PROCEDURE — 84443 ASSAY THYROID STIM HORMONE: CPT

## 2019-09-04 PROCEDURE — 82306 VITAMIN D 25 HYDROXY: CPT

## 2019-09-04 PROCEDURE — 36415 COLL VENOUS BLD VENIPUNCTURE: CPT

## 2019-09-04 PROCEDURE — 84425 ASSAY OF VITAMIN B-1: CPT

## 2019-09-04 PROCEDURE — 82746 ASSAY OF FOLIC ACID SERUM: CPT

## 2019-09-04 PROCEDURE — 84446 ASSAY OF VITAMIN E: CPT

## 2019-09-04 PROCEDURE — 80061 LIPID PANEL: CPT

## 2019-09-04 PROCEDURE — 82607 VITAMIN B-12: CPT

## 2019-09-04 PROCEDURE — 83550 IRON BINDING TEST: CPT

## 2019-09-04 PROCEDURE — 74240 X-RAY XM UPR GI TRC 1CNTRST: CPT

## 2019-09-04 PROCEDURE — 85025 COMPLETE CBC W/AUTO DIFF WBC: CPT

## 2019-09-05 LAB
ESTIMATED AVERAGE GLUCOSE: 114 MG/DL
HBA1C MFR BLD: 5.6 %

## 2019-09-06 LAB
ALPHA-TOCOPHEROL: 14.7 MG/L (ref 5.5–18)
GAMMA-TOCOPHEROL: 1.6 MG/L (ref 0–6)
RETINYL PALMITATE: <0.02 MG/L (ref 0–0.1)
VITAMIN A LEVEL: 0.84 MG/L (ref 0.3–1.2)
VITAMIN A, INTERP: NORMAL
VITAMIN B1 WHOLE BLOOD: 85 NMOL/L (ref 70–180)
VITAMIN K1: 3.4 NMOL/L (ref 0.22–4.88)

## 2019-09-13 ENCOUNTER — HOSPITAL ENCOUNTER (OUTPATIENT)
Age: 54
Setting detail: OUTPATIENT SURGERY
Discharge: HOME | End: 2019-09-13
Attending: SURGERY | Admitting: SURGERY
Payer: MEDICARE

## 2019-09-13 ENCOUNTER — ANESTHESIA (OUTPATIENT)
Dept: ENDOSCOPY | Age: 54
End: 2019-09-13
Payer: MEDICARE

## 2019-09-13 VITALS
DIASTOLIC BLOOD PRESSURE: 65 MMHG | SYSTOLIC BLOOD PRESSURE: 129 MMHG | RESPIRATION RATE: 12 BRPM | OXYGEN SATURATION: 98 %

## 2019-09-13 VITALS
SYSTOLIC BLOOD PRESSURE: 105 MMHG | HEART RATE: 57 BPM | DIASTOLIC BLOOD PRESSURE: 85 MMHG | RESPIRATION RATE: 18 BRPM | TEMPERATURE: 98.4 F | BODY MASS INDEX: 44.41 KG/M2 | OXYGEN SATURATION: 96 % | WEIGHT: 293 LBS | HEIGHT: 68 IN

## 2019-09-13 LAB — HCG(URINE) PREGNANCY TEST: NEGATIVE

## 2019-09-13 PROCEDURE — 2500000003 HC RX 250 WO HCPCS: Performed by: NURSE ANESTHETIST, CERTIFIED REGISTERED

## 2019-09-13 PROCEDURE — 7100000001 HC PACU RECOVERY - ADDTL 15 MIN: Performed by: SURGERY

## 2019-09-13 PROCEDURE — 3700000000 HC ANESTHESIA ATTENDED CARE: Performed by: SURGERY

## 2019-09-13 PROCEDURE — 7100000000 HC PACU RECOVERY - FIRST 15 MIN: Performed by: SURGERY

## 2019-09-13 PROCEDURE — 88305 TISSUE EXAM BY PATHOLOGIST: CPT

## 2019-09-13 PROCEDURE — 7100000011 HC PHASE II RECOVERY - ADDTL 15 MIN: Performed by: SURGERY

## 2019-09-13 PROCEDURE — 3609012400 HC EGD TRANSORAL BIOPSY SINGLE/MULTIPLE: Performed by: SURGERY

## 2019-09-13 PROCEDURE — 43239 EGD BIOPSY SINGLE/MULTIPLE: CPT | Performed by: SURGERY

## 2019-09-13 PROCEDURE — 2580000003 HC RX 258: Performed by: ANESTHESIOLOGY

## 2019-09-13 PROCEDURE — 7100000010 HC PHASE II RECOVERY - FIRST 15 MIN: Performed by: SURGERY

## 2019-09-13 PROCEDURE — 6360000002 HC RX W HCPCS: Performed by: NURSE ANESTHETIST, CERTIFIED REGISTERED

## 2019-09-13 PROCEDURE — 84703 CHORIONIC GONADOTROPIN ASSAY: CPT

## 2019-09-13 PROCEDURE — 2709999900 HC NON-CHARGEABLE SUPPLY: Performed by: SURGERY

## 2019-09-13 RX ORDER — LIDOCAINE HYDROCHLORIDE 10 MG/ML
1 INJECTION, SOLUTION EPIDURAL; INFILTRATION; INTRACAUDAL; PERINEURAL
Status: DISCONTINUED | OUTPATIENT
Start: 2019-09-13 | End: 2019-09-13 | Stop reason: HOSPADM

## 2019-09-13 RX ORDER — LIDOCAINE HYDROCHLORIDE 20 MG/ML
INJECTION, SOLUTION EPIDURAL; INFILTRATION; INTRACAUDAL; PERINEURAL PRN
Status: DISCONTINUED | OUTPATIENT
Start: 2019-09-13 | End: 2019-09-13 | Stop reason: SDUPTHER

## 2019-09-13 RX ORDER — PROPOFOL 10 MG/ML
INJECTION, EMULSION INTRAVENOUS PRN
Status: DISCONTINUED | OUTPATIENT
Start: 2019-09-13 | End: 2019-09-13 | Stop reason: SDUPTHER

## 2019-09-13 RX ORDER — SODIUM CHLORIDE 0.9 % (FLUSH) 0.9 %
10 SYRINGE (ML) INJECTION EVERY 12 HOURS SCHEDULED
Status: DISCONTINUED | OUTPATIENT
Start: 2019-09-13 | End: 2019-09-13 | Stop reason: HOSPADM

## 2019-09-13 RX ORDER — SODIUM CHLORIDE 9 MG/ML
INJECTION, SOLUTION INTRAVENOUS CONTINUOUS
Status: DISCONTINUED | OUTPATIENT
Start: 2019-09-13 | End: 2019-09-13 | Stop reason: HOSPADM

## 2019-09-13 RX ORDER — SODIUM CHLORIDE 0.9 % (FLUSH) 0.9 %
10 SYRINGE (ML) INJECTION PRN
Status: DISCONTINUED | OUTPATIENT
Start: 2019-09-13 | End: 2019-09-13 | Stop reason: HOSPADM

## 2019-09-13 RX ORDER — OMEPRAZOLE 20 MG/1
20 CAPSULE, DELAYED RELEASE ORAL DAILY
Qty: 30 CAPSULE | Refills: 3 | Status: SHIPPED | OUTPATIENT
Start: 2019-09-13 | End: 2020-12-22 | Stop reason: ALTCHOICE

## 2019-09-13 RX ADMIN — SODIUM CHLORIDE: 9 INJECTION, SOLUTION INTRAVENOUS at 06:50

## 2019-09-13 RX ADMIN — PROPOFOL 40 MG: 10 INJECTION, EMULSION INTRAVENOUS at 08:26

## 2019-09-13 RX ADMIN — PROPOFOL 30 MG: 10 INJECTION, EMULSION INTRAVENOUS at 08:28

## 2019-09-13 RX ADMIN — PROPOFOL 80 MG: 10 INJECTION, EMULSION INTRAVENOUS at 08:23

## 2019-09-13 RX ADMIN — LIDOCAINE HYDROCHLORIDE 100 MG: 20 INJECTION, SOLUTION EPIDURAL; INFILTRATION; INTRACAUDAL; PERINEURAL at 08:23

## 2019-09-13 RX ADMIN — PROPOFOL 40 MG: 10 INJECTION, EMULSION INTRAVENOUS at 08:24

## 2019-09-13 RX ADMIN — PROPOFOL 40 MG: 10 INJECTION, EMULSION INTRAVENOUS at 08:25

## 2019-09-13 ASSESSMENT — COPD QUESTIONNAIRES: CAT_SEVERITY: MODERATE

## 2019-09-13 NOTE — PROGRESS NOTES
Pt arrived from endo to PACU bay 3. Report received from endo staff. Pt arouses easily to voice. Pt on RA, NSR, VSS. Will continue to monitor.

## 2019-09-13 NOTE — ANESTHESIA PRE PROCEDURE
 H/O arthroscopy of knee Z98.890    Rotator cuff tendonitis, left M75.82    Mixed hyperlipidemia E78.2    Pre-operative clearance Z01.818    Status post gastric banding Z98.84    Morbid obesity with BMI of 45.0-49.9, adult ([facility]) E66.01, Z68.42       Past Medical History:        Diagnosis Date    Anxiety     Arthritis     Asthma     Fibromyalgia     Hypertension     Migraine     Primary osteoarthritis of both knees     Sleep apnea     uses CPAP    Thyroid disease        Past Surgical History:        Procedure Laterality Date    CHOLECYSTECTOMY  10/14/2014    Open cholecystectomy and hepatico jejunostomy, estela-en-y    COLONOSCOPY  06/01/2017    polyp    ENDOMETRIAL ABLATION  02/2013    KNEE ARTHROSCOPY Left 2/23/16    lateral menisectomoy chondroplasty synovectomy    LAP BAND      THYROIDECTOMY, PARTIAL         Social History:    Social History     Tobacco Use    Smoking status: Never Smoker    Smokeless tobacco: Never Used   Substance Use Topics    Alcohol use:  Yes     Alcohol/week: 1.0 standard drinks     Types: 1 Glasses of wine per week     Comment: socially/ rare                                Counseling given: Not Answered      Vital Signs (Current):   Vitals:    08/27/19 1440 09/13/19 0636   BP:  115/67   Pulse:  64   Resp:  14   Temp:  99.1 °F (37.3 °C)   TempSrc:  Temporal   SpO2:  95%   Weight: (!) 313 lb (142 kg) (!) 300 lb 14.4 oz (136.5 kg)   Height: 5' 8\" (1.727 m) 5' 8\" (1.727 m)                                              BP Readings from Last 3 Encounters:   09/13/19 115/67   08/27/19 116/77   06/01/17 132/78       NPO Status: Time of last liquid consumption: 2200                        Time of last solid consumption: 2100                        Date of last liquid consumption: 09/12/19                        Date of last solid food consumption: 09/11/19    BMI:   Wt Readings from Last 3 Encounters:   09/13/19 (!) 300 lb 14.4 oz (136.5 kg)   08/27/19 (!) 313 lb (142 kg)

## 2019-09-13 NOTE — OP NOTE
[facility]) Physicians   [facility]  [address] [place] [address], [address] [place] [id number] . Phone: [phone]  Fax: [phone]         Esophagogastroduodenoscopy     Indications: s/p lap band , now with symptoms of Dysphagia ,  nausea and vomiting. Pre-operative Diagnosis: s/p lap band, rule out erosion or slippage . Post-operative Diagnosis: same. + normal band position     Surgeon: Marcelle Zaldivar MD    Anesthesia: General endotracheal anesthesia    ASA Class: 3    Procedure Details   The patient was seen in the Holding Room. The risks, benefits, complications, treatment options, and expected outcomes were discussed with the patient. Pre-op Endoscopy recommended to rule any intragastric pathology that would interfere with proposed procedure /  And or due to current conditions. The possibilities of reaction to medication, pulmonary aspiration, perforation of viscus, bleeding, recurrent infection, the need for additional procedures, failure to diagnose a condition, and creating a complication requiring transfusion or operation were discussed with the patient. The patient concurred with the proposed plan, giving informed consent. The site of surgery properly noted/marked. The patient was taken to Endoscopy Suite, identified as Yola Bobo and the procedure verified as  Esophagogastroduodenoscopy  A Time Out was held and the above information confirmed. Upper Endoscopy: An endoscope was inserted through the oropharynx into the upper esophagus. The endoscope was passed into the stomach to the level of the pylorus and passed to the duodenum where the ampulla was visualized and duodenum was normal. Then the scope was retracted back to the stomach and it was abnormal , mild antral gastritis biopsy of the antrum obtained, then retroflexed and the gastroesophageal junction was inspected . There was no hiatal hernia, no clear evidence of Chavarria's.   No band slippage noted nor erosion,

## 2019-09-19 ENCOUNTER — OFFICE VISIT (OUTPATIENT)
Dept: BARIATRICS/WEIGHT MGMT | Age: 54
End: 2019-09-19
Payer: MEDICARE

## 2019-09-19 VITALS
SYSTOLIC BLOOD PRESSURE: 117 MMHG | WEIGHT: 293 LBS | HEART RATE: 68 BPM | DIASTOLIC BLOOD PRESSURE: 77 MMHG | OXYGEN SATURATION: 98 % | HEIGHT: 68 IN | RESPIRATION RATE: 18 BRPM | BODY MASS INDEX: 44.41 KG/M2

## 2019-09-19 DIAGNOSIS — Z98.84 STATUS POST GASTRIC BANDING: ICD-10-CM

## 2019-09-19 DIAGNOSIS — E66.01 MORBID OBESITY WITH BMI OF 45.0-49.9, ADULT (HCC): Primary | ICD-10-CM

## 2019-09-19 DIAGNOSIS — E78.2 MIXED HYPERLIPIDEMIA: ICD-10-CM

## 2019-09-19 DIAGNOSIS — I10 ESSENTIAL HYPERTENSION: ICD-10-CM

## 2019-09-19 PROCEDURE — 43999 UNLISTED PROCEDURE STOMACH: CPT | Performed by: SURGERY

## 2019-09-19 NOTE — PROGRESS NOTES
[facility]) Physicians   [employer] [facility] [employer]  Chetan Blackmon MD, [facility], [address] [place] [address]    [place]  [address] . Phone: [phone]  Fax: [phone]          Chief Complaint   Patient presents with    Obesity     f/u band move to [facility]           HPI:     Colletta Fells is a very pleasant 49 y.o.  female , Body mass index is 46.91 kg/m². [place] And multiple medical problems who is presenting for bariatric follow up care. Eliane Sagastume is s/p laparoscopic gastric band . Comes today to the clinic without any complaints. Patient denies any nausea, vomiting, fevers, chills, shortness of breath, chest pain, constipation or urinary symptoms. Denies any heartburn nor dysphagia. Eliane Sagastume is feeling very well, and is very active. Patient is very pleased with the weight loss and resolution of co-morbid conditions. Pain Assessment   Denies any abdominal pain     Past Medical History:   Diagnosis Date    Anxiety     Arthritis     Asthma     Fibromyalgia     Hypertension     Migraine     Primary osteoarthritis of both knees     Sleep apnea     uses CPAP    Thyroid disease      Past Surgical History:   Procedure Laterality Date    CHOLECYSTECTOMY  10/14/2014    Open cholecystectomy and hepatico jejunostomy, estela-en-y    COLONOSCOPY  06/01/2017    polyp    ENDOMETRIAL ABLATION  02/2013    KNEE ARTHROSCOPY Left 2/23/16    lateral menisectomoy chondroplasty synovectomy    LAP BAND      THYROIDECTOMY, PARTIAL      UPPER GASTROINTESTINAL ENDOSCOPY N/A 9/13/2019    EGD BIOPSY performed by Son May MD at [address] [place] [address]     Family History   Problem Relation Age of Onset    Heart Disease Mother     Cancer Mother     Heart Disease Maternal Grandfather     Heart Disease Father     Stroke Father      Social History     Tobacco Use    Smoking status: Never Smoker    Smokeless tobacco: Never Used   Substance Use Topics    Alcohol use:  Yes     Alcohol/week: 1.0 standard drinks     Types: 1 Inhale 2 puffs into the lungs every 6 hours as needed. , Disp: , Rfl:       Review of Systems - History obtained from the patient  General ROS: negative  Psychological ROS: negative  Ophthalmic ROS: negative  Neurological ROS: negative  ENT ROS: negative  Allergy and Immunology ROS: negative  Hematological and Lymphatic ROS: negative  Endocrine ROS: negative  Breast ROS: negative  Respiratory ROS: negative  Cardiovascular ROS: negative  Gastrointestinal ROS:negative  Genito-Urinary ROS: negative  Musculoskeletal ROS: negative   Skin ROS: negative    Physical Exam   Vitals Reviewed   Constitutional: Patient is oriented to person, place, and time. Patient appears well-developed and well-nourished. Patient is active and cooperative. Non-toxic appearance. No distress. HENT:   Head: Normocephalic and atraumatic. Head is without abrasion and without laceration. Hair is normal.   Right Ear: External ear normal. No lacerations. No drainage, swelling . Left Ear: External ear normal. No lacerations. No drainage, swelling. Nose: Nose normal. No nose lacerations or nasal deformity. Eyes: Conjunctivae, EOM and lids are normal. Right eye exhibits no discharge. No foreign body present in the right eye. Left eye exhibits no discharge. No foreign body present in the left eye. No scleral icterus. Neck: Trachea normal and normal range of motion. No JVD present. Pulmonary/Chest: Effort normal. No accessory muscle usage or stridor. No apnea. No respiratory distress. Cardiovascular: Normal rate and no JVD. Abdominal: Normal appearance. Patient exhibits no distension. Abdomen is soft, non tender. Musculoskeletal: Normal range of motion. Patient exhibits no edema. Neurological: Patient is alert and oriented to person, place, and time. Patient has normal strength. GCS eye subscore is 4. GCS verbal subscore is 5. GCS motor subscore is 6. Skin: Skin is warm and dry. No abrasion and no rash noted.  Patient is not counseling, answering questions, addressing concerns, reviewing labs and/or other studies with the patient as well as coordinating care. Neha Esteban is starting to make better dietary choices. We discussed her normal EGD. She will start MWL. The patient is having increased hunger and feeling decreased restriction. she will need a fill today. The port was identified by palpation and needle aspiration to confirm needle placement in reservoir. The fluid aspirated and injected easily. 1ccs were added to the band for a total of 7. The patient was given dietary instructions and was able to tolerate water before leaving the office. The patient was instructed to call for any problems. Neha Esteban will benefit from seeing our Behaviorist to work on behavioral aspects / changes to help with weight loss. RTC in 1 months  Healthy Diet and Exercise          Patient advised that its their responsibility to follow up for studies, referrals and/or labs ordered today.

## 2019-09-24 ENCOUNTER — OFFICE VISIT (OUTPATIENT)
Dept: BARIATRICS/WEIGHT MGMT | Age: 54
End: 2019-09-24
Payer: MEDICARE

## 2019-09-24 ENCOUNTER — TELEPHONE (OUTPATIENT)
Dept: BARIATRICS/WEIGHT MGMT | Age: 54
End: 2019-09-24

## 2019-09-24 VITALS
OXYGEN SATURATION: 98 % | DIASTOLIC BLOOD PRESSURE: 88 MMHG | BODY MASS INDEX: 44.41 KG/M2 | SYSTOLIC BLOOD PRESSURE: 139 MMHG | RESPIRATION RATE: 18 BRPM | WEIGHT: 293 LBS | HEART RATE: 68 BPM | HEIGHT: 68 IN

## 2019-09-24 DIAGNOSIS — Z98.84 STATUS POST GASTRIC BANDING: Primary | ICD-10-CM

## 2019-09-24 PROCEDURE — 43999 UNLISTED PROCEDURE STOMACH: CPT | Performed by: SURGERY

## 2019-09-24 NOTE — TELEPHONE ENCOUNTER
Marilia Lennon was seen on 9/19/19 by Dr. Grant Ruano for a band fill. States that she is having horrible heart burn and cannot sleep. Feels like she needs the fluid taken back out.   Please call [phone]

## 2019-09-24 NOTE — PROGRESS NOTES
Since last fill, having issues with nausea and vomiting ( we added 1 cc total of 7 now in band)   Band will be unfilled today and let her stomach rest and pt will continue with MWL. The port was identified by palpation and needle aspiration to confirm needle placement in reservoir. The fluid aspirated and injected easily. 7 ccs were total removed. The patient was given dietary instructions and was able to tolerate water before leaving the office. The patient was instructed to call for any problems.

## 2019-09-24 NOTE — TELEPHONE ENCOUNTER
Spoke with patient, she has had some vomiting,   Band feels to tight as well as pressure when she is laying down at night. Will speak with Dr. Rolando Montejo see if we can do an unfill ASAP.

## 2019-09-26 PROBLEM — Z01.818 PRE-OPERATIVE CLEARANCE: Status: RESOLVED | Noted: 2019-08-27 | Resolved: 2019-09-26

## 2019-10-02 DIAGNOSIS — M17.0 PRIMARY OSTEOARTHRITIS OF BOTH KNEES: Primary | ICD-10-CM

## 2019-10-16 ENCOUNTER — TELEPHONE (OUTPATIENT)
Dept: ORTHOPEDIC SURGERY | Age: 54
End: 2019-10-16

## 2019-10-17 ENCOUNTER — TELEPHONE (OUTPATIENT)
Dept: ORTHOPEDIC SURGERY | Age: 54
End: 2019-10-17

## 2019-10-18 ENCOUNTER — TELEPHONE (OUTPATIENT)
Dept: ORTHOPEDIC SURGERY | Age: 54
End: 2019-10-18

## 2019-10-23 ENCOUNTER — OFFICE VISIT (OUTPATIENT)
Dept: ORTHOPEDIC SURGERY | Age: 54
End: 2019-10-23
Payer: MEDICARE

## 2019-10-23 VITALS
WEIGHT: 293 LBS | HEART RATE: 87 BPM | HEIGHT: 68 IN | BODY MASS INDEX: 44.41 KG/M2 | SYSTOLIC BLOOD PRESSURE: 139 MMHG | DIASTOLIC BLOOD PRESSURE: 81 MMHG

## 2019-10-23 DIAGNOSIS — M54.50 LOW BACK PAIN, UNSPECIFIED BACK PAIN LATERALITY, UNSPECIFIED CHRONICITY, UNSPECIFIED WHETHER SCIATICA PRESENT: Primary | ICD-10-CM

## 2019-10-23 DIAGNOSIS — M51.36 DDD (DEGENERATIVE DISC DISEASE), LUMBAR: ICD-10-CM

## 2019-10-23 DIAGNOSIS — M54.16 LUMBAR RADICULITIS: ICD-10-CM

## 2019-10-23 PROCEDURE — 99203 OFFICE O/P NEW LOW 30 MIN: CPT | Performed by: PHYSICIAN ASSISTANT

## 2019-10-23 RX ORDER — METHYLPREDNISOLONE 4 MG/1
TABLET ORAL
Qty: 1 KIT | Refills: 0 | Status: SHIPPED | OUTPATIENT
Start: 2019-10-23 | End: 2020-12-22 | Stop reason: ALTCHOICE

## 2019-10-25 ENCOUNTER — TELEPHONE (OUTPATIENT)
Dept: ORTHOPEDIC SURGERY | Age: 54
End: 2019-10-25

## 2019-10-28 ENCOUNTER — OFFICE VISIT (OUTPATIENT)
Dept: ORTHOPEDIC SURGERY | Age: 54
End: 2019-10-28
Payer: MEDICARE

## 2019-10-28 DIAGNOSIS — M17.0 PRIMARY OSTEOARTHRITIS OF BOTH KNEES: Primary | ICD-10-CM

## 2019-10-28 RX ORDER — HYALURONATE SODIUM 10 MG/ML
40 SYRINGE (ML) INTRAARTICULAR ONCE
Status: COMPLETED | OUTPATIENT
Start: 2019-10-28 | End: 2019-10-28

## 2019-10-28 RX ADMIN — Medication 40 MG: at 11:06

## 2019-10-30 ENCOUNTER — HOSPITAL ENCOUNTER (OUTPATIENT)
Dept: PHYSICAL THERAPY | Age: 54
Setting detail: THERAPIES SERIES
Discharge: HOME | End: 2019-10-30
Payer: MEDICARE

## 2019-10-30 PROCEDURE — 97161 PT EVAL LOW COMPLEX 20 MIN: CPT

## 2019-10-30 PROCEDURE — 97140 MANUAL THERAPY 1/> REGIONS: CPT

## 2019-11-04 ENCOUNTER — OFFICE VISIT (OUTPATIENT)
Dept: ORTHOPEDIC SURGERY | Age: 54
End: 2019-11-04
Payer: MEDICARE

## 2019-11-04 DIAGNOSIS — M17.0 PRIMARY OSTEOARTHRITIS OF BOTH KNEES: Primary | ICD-10-CM

## 2019-11-04 PROCEDURE — 20611 DRAIN/INJ JOINT/BURSA W/US: CPT | Performed by: PHYSICIAN ASSISTANT

## 2019-11-04 RX ORDER — HYALURONATE SODIUM 10 MG/ML
40 SYRINGE (ML) INTRAARTICULAR ONCE
Status: COMPLETED | OUTPATIENT
Start: 2019-11-04 | End: 2019-11-04

## 2019-11-04 RX ADMIN — Medication 40 MG: at 10:42

## 2019-11-06 ENCOUNTER — HOSPITAL ENCOUNTER (OUTPATIENT)
Dept: PHYSICAL THERAPY | Age: 54
Setting detail: THERAPIES SERIES
Discharge: HOME | End: 2019-11-06
Payer: MEDICARE

## 2019-11-06 PROCEDURE — 97140 MANUAL THERAPY 1/> REGIONS: CPT

## 2019-11-08 ENCOUNTER — HOSPITAL ENCOUNTER (OUTPATIENT)
Dept: PHYSICAL THERAPY | Age: 54
Setting detail: THERAPIES SERIES
Discharge: HOME | End: 2019-11-08
Payer: MEDICARE

## 2019-11-08 PROCEDURE — 97110 THERAPEUTIC EXERCISES: CPT

## 2019-11-11 ENCOUNTER — OFFICE VISIT (OUTPATIENT)
Dept: ORTHOPEDIC SURGERY | Age: 54
End: 2019-11-11
Payer: MEDICARE

## 2019-11-11 DIAGNOSIS — M17.0 PRIMARY OSTEOARTHRITIS OF BOTH KNEES: Primary | ICD-10-CM

## 2019-11-11 PROCEDURE — 20611 DRAIN/INJ JOINT/BURSA W/US: CPT | Performed by: PHYSICIAN ASSISTANT

## 2019-11-11 RX ORDER — HYALURONATE SODIUM 10 MG/ML
40 SYRINGE (ML) INTRAARTICULAR ONCE
Status: COMPLETED | OUTPATIENT
Start: 2019-11-11 | End: 2019-11-11

## 2019-11-11 RX ADMIN — Medication 40 MG: at 10:49

## 2019-11-12 ENCOUNTER — OFFICE VISIT (OUTPATIENT)
Dept: ORTHOPEDIC SURGERY | Age: 54
End: 2019-11-12
Payer: MEDICARE

## 2019-11-12 VITALS
BODY MASS INDEX: 44.41 KG/M2 | SYSTOLIC BLOOD PRESSURE: 139 MMHG | HEIGHT: 68 IN | HEART RATE: 79 BPM | WEIGHT: 293 LBS | DIASTOLIC BLOOD PRESSURE: 81 MMHG

## 2019-11-12 DIAGNOSIS — M54.16 LUMBAR RADICULITIS: ICD-10-CM

## 2019-11-12 DIAGNOSIS — M51.36 DDD (DEGENERATIVE DISC DISEASE), LUMBAR: Primary | ICD-10-CM

## 2019-11-12 PROCEDURE — 99214 OFFICE O/P EST MOD 30 MIN: CPT | Performed by: PHYSICIAN ASSISTANT

## 2019-11-13 ENCOUNTER — APPOINTMENT (OUTPATIENT)
Dept: PHYSICAL THERAPY | Age: 54
End: 2019-11-13
Payer: MEDICARE

## 2019-11-15 ENCOUNTER — APPOINTMENT (OUTPATIENT)
Dept: PHYSICAL THERAPY | Age: 54
End: 2019-11-15
Payer: MEDICARE

## 2019-11-20 ENCOUNTER — APPOINTMENT (OUTPATIENT)
Dept: PHYSICAL THERAPY | Age: 54
End: 2019-11-20
Payer: MEDICARE

## 2019-11-22 ENCOUNTER — APPOINTMENT (OUTPATIENT)
Dept: PHYSICAL THERAPY | Age: 54
End: 2019-11-22
Payer: MEDICARE

## 2019-12-11 ENCOUNTER — OFFICE VISIT (OUTPATIENT)
Dept: ORTHOPEDIC SURGERY | Age: 54
End: 2019-12-11
Payer: MEDICARE

## 2019-12-11 VITALS — WEIGHT: 293 LBS | HEIGHT: 68 IN | BODY MASS INDEX: 44.41 KG/M2

## 2019-12-11 DIAGNOSIS — M17.11 PRIMARY OSTEOARTHRITIS OF RIGHT KNEE: Primary | ICD-10-CM

## 2019-12-11 DIAGNOSIS — M25.561 RIGHT KNEE PAIN, UNSPECIFIED CHRONICITY: ICD-10-CM

## 2019-12-11 DIAGNOSIS — E66.01 CLASS 3 SEVERE OBESITY DUE TO EXCESS CALORIES WITH BODY MASS INDEX (BMI) OF 45.0 TO 49.9 IN ADULT, UNSPECIFIED WHETHER SERIOUS COMORBIDITY PRESENT (HCC): ICD-10-CM

## 2019-12-11 PROCEDURE — 99214 OFFICE O/P EST MOD 30 MIN: CPT | Performed by: PHYSICIAN ASSISTANT

## 2019-12-11 PROCEDURE — L1812 KO ELASTIC W/JOINTS PRE OTS: HCPCS | Performed by: PHYSICIAN ASSISTANT

## 2020-03-27 ENCOUNTER — TELEPHONE (OUTPATIENT)
Dept: ORTHOPEDIC SURGERY | Age: 55
End: 2020-03-27

## 2020-04-20 ENCOUNTER — OFFICE VISIT (OUTPATIENT)
Dept: ORTHOPEDIC SURGERY | Age: 55
End: 2020-04-20
Payer: MEDICARE

## 2020-04-20 PROCEDURE — 99212 OFFICE O/P EST SF 10 MIN: CPT | Performed by: PHYSICIAN ASSISTANT

## 2020-05-04 ENCOUNTER — OFFICE VISIT (OUTPATIENT)
Dept: ORTHOPEDIC SURGERY | Age: 55
End: 2020-05-04
Payer: MEDICARE

## 2020-05-04 VITALS — BODY MASS INDEX: 45.99 KG/M2 | WEIGHT: 293 LBS | HEIGHT: 67 IN

## 2020-05-04 PROCEDURE — 99213 OFFICE O/P EST LOW 20 MIN: CPT | Performed by: ORTHOPAEDIC SURGERY

## 2020-05-06 ENCOUNTER — TELEPHONE (OUTPATIENT)
Dept: ORTHOPEDIC SURGERY | Age: 55
End: 2020-05-06

## 2020-05-22 ENCOUNTER — TELEPHONE (OUTPATIENT)
Dept: ORTHOPEDIC SURGERY | Age: 55
End: 2020-05-22

## 2020-05-27 ENCOUNTER — NURSE ONLY (OUTPATIENT)
Dept: ORTHOPEDIC SURGERY | Age: 55
End: 2020-05-27
Payer: MEDICARE

## 2020-05-27 PROCEDURE — 20611 DRAIN/INJ JOINT/BURSA W/US: CPT | Performed by: PHYSICIAN ASSISTANT

## 2020-05-27 RX ORDER — HYALURONATE SODIUM 10 MG/ML
40 SYRINGE (ML) INTRAARTICULAR ONCE
Status: COMPLETED | OUTPATIENT
Start: 2020-05-27 | End: 2020-05-27

## 2020-05-27 RX ADMIN — Medication 40 MG: at 08:44

## 2020-06-01 ENCOUNTER — HOSPITAL ENCOUNTER (OUTPATIENT)
Age: 55
Discharge: HOME | End: 2020-06-01
Payer: MEDICARE

## 2020-06-01 ENCOUNTER — OFFICE VISIT (OUTPATIENT)
Dept: ORTHOPEDIC SURGERY | Age: 55
End: 2020-06-01
Payer: MEDICARE

## 2020-06-01 VITALS — HEIGHT: 67 IN | RESPIRATION RATE: 17 BRPM | WEIGHT: 293 LBS | BODY MASS INDEX: 45.99 KG/M2

## 2020-06-01 LAB
A/G RATIO: 1.3 (ref 1.1–2.2)
ALBUMIN SERPL-MCNC: 4.2 G/DL (ref 3.4–5)
ALP BLD-CCNC: 130 U/L (ref 40–129)
ALT SERPL-CCNC: 17 U/L (ref 10–40)
ANION GAP SERPL CALCULATED.3IONS-SCNC: 13 MMOL/L (ref 3–16)
AST SERPL-CCNC: 30 U/L (ref 15–37)
BASOPHILS ABSOLUTE: 0.1 K/UL (ref 0–0.2)
BASOPHILS RELATIVE PERCENT: 0.7 %
BILIRUB SERPL-MCNC: 0.3 MG/DL (ref 0–1)
BUN BLDV-MCNC: 20 MG/DL (ref 7–20)
C-REACTIVE PROTEIN: 26.3 MG/L (ref 0–5.1)
CALCIUM SERPL-MCNC: 9.6 MG/DL (ref 8.3–10.6)
CHLORIDE BLD-SCNC: 98 MMOL/L (ref 99–110)
CO2: 24 MMOL/L (ref 21–32)
CREAT SERPL-MCNC: 1.2 MG/DL (ref 0.6–1.1)
EOSINOPHILS ABSOLUTE: 0.3 K/UL (ref 0–0.6)
EOSINOPHILS RELATIVE PERCENT: 3.7 %
GFR AFRICAN AMERICAN: 57
GFR NON-AFRICAN AMERICAN: 47
GLOBULIN: 3.3 G/DL
GLUCOSE BLD-MCNC: 98 MG/DL (ref 70–99)
HCT VFR BLD CALC: 42.5 % (ref 36–48)
HEMOGLOBIN: 14 G/DL (ref 12–16)
LYMPHOCYTES ABSOLUTE: 1.9 K/UL (ref 1–5.1)
LYMPHOCYTES RELATIVE PERCENT: 25.4 %
MCH RBC QN AUTO: 27.7 PG (ref 26–34)
MCHC RBC AUTO-ENTMCNC: 32.9 G/DL (ref 31–36)
MCV RBC AUTO: 84.4 FL (ref 80–100)
MONOCYTES ABSOLUTE: 0.7 K/UL (ref 0–1.3)
MONOCYTES RELATIVE PERCENT: 9 %
NEUTROPHILS ABSOLUTE: 4.6 K/UL (ref 1.7–7.7)
NEUTROPHILS RELATIVE PERCENT: 61.2 %
PDW BLD-RTO: 14.9 % (ref 12.4–15.4)
PLATELET # BLD: 232 K/UL (ref 135–450)
PMV BLD AUTO: 10.2 FL (ref 5–10.5)
POTASSIUM SERPL-SCNC: 3.6 MMOL/L (ref 3.5–5.1)
RBC # BLD: 5.04 M/UL (ref 4–5.2)
RHEUMATOID FACTOR: <10 IU/ML
SEDIMENTATION RATE, ERYTHROCYTE: 42 MM/HR (ref 0–30)
SODIUM BLD-SCNC: 135 MMOL/L (ref 136–145)
TOTAL PROTEIN: 7.5 G/DL (ref 6.4–8.2)
URIC ACID, SERUM: 7.5 MG/DL (ref 2.6–6)
WBC # BLD: 7.4 K/UL (ref 4–11)

## 2020-06-01 PROCEDURE — 99203 OFFICE O/P NEW LOW 30 MIN: CPT | Performed by: ORTHOPAEDIC SURGERY

## 2020-06-01 PROCEDURE — 86431 RHEUMATOID FACTOR QUANT: CPT

## 2020-06-01 PROCEDURE — 36415 COLL VENOUS BLD VENIPUNCTURE: CPT

## 2020-06-01 PROCEDURE — 86140 C-REACTIVE PROTEIN: CPT

## 2020-06-01 PROCEDURE — 80053 COMPREHEN METABOLIC PANEL: CPT

## 2020-06-01 PROCEDURE — 86200 CCP ANTIBODY: CPT

## 2020-06-01 PROCEDURE — 85025 COMPLETE CBC W/AUTO DIFF WBC: CPT

## 2020-06-01 PROCEDURE — 86038 ANTINUCLEAR ANTIBODIES: CPT

## 2020-06-01 PROCEDURE — 84550 ASSAY OF BLOOD/URIC ACID: CPT

## 2020-06-01 PROCEDURE — 85652 RBC SED RATE AUTOMATED: CPT

## 2020-06-01 RX ORDER — VALACYCLOVIR HYDROCHLORIDE 1 G/1
1000 TABLET, FILM COATED ORAL 3 TIMES DAILY
COMMUNITY
Start: 2019-09-02 | End: 2020-12-22 | Stop reason: ALTCHOICE

## 2020-06-01 NOTE — PROGRESS NOTES
acetaminophen (TYLENOL) 500 MG tablet Take 1,000 mg by mouth daily      cloNIDine (CATAPRES) 0.1 MG tablet Take 0.1 mg by mouth 2 times daily      Multiple Vitamins-Minerals (THERAPEUTIC MULTIVITAMIN-MINERALS) tablet Take 1 tablet by mouth daily      Vitamin D (CHOLECALCIFEROL) 1000 UNITS CAPS capsule Take 1,000 Units by mouth daily.  Topiramate (TOPAMAX PO) Take by mouth Take 50 mg in am and 25 in pm      montelukast (SINGULAIR) 10 MG tablet Take 10 mg by mouth nightly.  Fexofenadine HCl (ALLEGRA PO) Take 60 mg by mouth daily as needed       triamterene-hydrochlorothiazide (MAXZIDE) 75-50 MG per tablet Take 1 tablet by mouth daily. Taking 1/2 tab daily         ALPRAZolam (XANAX) 0.25 MG tablet Take 0.5 mg by mouth nightly as needed.  cyclobenzaprine (FLEXERIL) 10 MG tablet Take 5 mg by mouth nightly as needed.  fluticasone (FLONASE) 50 MCG/ACT nasal spray 1 spray by Nasal route daily.  vitamin B-12 (CYANOCOBALAMIN) 100 MCG tablet Take 50 mcg by mouth daily.  FLUoxetine HCl (PROZAC PO) Take 80 mg by mouth daily.  Levothyroxine Sodium (LEVOXYL PO) Take 50 mcg by mouth daily. Unknown dose      albuterol (PROVENTIL HFA;VENTOLIN HFA) 108 (90 BASE) MCG/ACT inhaler Inhale 2 puffs into the lungs every 6 hours as needed. No current facility-administered medications on file prior to visit.       Past Medical History:   Diagnosis Date    Anxiety     Arthritis     Asthma     Fibromyalgia     Hypertension     Migraine     Primary osteoarthritis of both knees     Sleep apnea     uses CPAP    Thyroid disease      Allergies   Allergen Reactions    Ace Inhibitors      angioedema    Amlodipine      Excessive urination    Lisinopril Swelling     angioedema    Norvasc [Amlodipine Besylate]      Excessive urination    Verapamil      Make hair fall out  Make hair fall out     Social History     Socioeconomic History    Marital status:      Spouse name: Not on

## 2020-06-02 LAB
ANTI-NUCLEAR ANTIBODY (ANA): NEGATIVE
CYCLIC CITRULLINATED PEPTIDE ANTIBODY IGG: 0.8 U/ML (ref 0–2.9)

## 2020-06-03 ENCOUNTER — NURSE ONLY (OUTPATIENT)
Dept: ORTHOPEDIC SURGERY | Age: 55
End: 2020-06-03
Payer: MEDICARE

## 2020-06-03 PROCEDURE — 20611 DRAIN/INJ JOINT/BURSA W/US: CPT | Performed by: PHYSICIAN ASSISTANT

## 2020-06-03 RX ORDER — HYALURONATE SODIUM 10 MG/ML
40 SYRINGE (ML) INTRAARTICULAR ONCE
Status: COMPLETED | OUTPATIENT
Start: 2020-06-03 | End: 2020-06-03

## 2020-06-03 RX ADMIN — Medication 40 MG: at 13:09

## 2020-06-03 NOTE — PROGRESS NOTES
Chief Complaint   Patient presents with    Knee Pain     #2 EUFLEXXA ZAIRA KNEES     Bilateral knee arthritis    The patient returns today for their second right and left knee Visco supplementation injection. The risks, benefits, and complications of the injections were again discussed in detail with the patient. The risks discussed included but are not limited to infection, skin reactions, hot swollen joints, and anaphylaxis. The patient gave verbal informed consent for the injection. The patient skin was prepped with 3 sterile gauze pads soaked with alcohol solution and the knee joint was injected with 2cc Euflexxa intra-articularly under sterile conditions . Technique: Under sterile conditions a SonIActive ultrasound unit with a variable frequency (6.0-15.0 MHz) linear transducer was used to localize the placement of a 22-gauge needle into the right and left knee joint. Findings: Successful needle placement for intra-articular Visco supplementation injection. Final images were taken and saved for permanent record. The patient tolerated the injection reasonably well. The patient was given instructions to ice the the knee and avoid strenuous activities for 24-48 hours. The patient was instructed to call the office immediately if there is increased pain, redness, warmth, fever, or chills. We will see the patient back in one week for the third injection. Isabela [facility]    This dictation was performed with a verbal recognition program [facility]) and it was checked for errors. It is possible that there are still dictated errors within this office note. If so, please bring any errors to my attention for an addendum. All efforts were made to ensure that this office note is accurate.

## 2020-06-10 ENCOUNTER — NURSE ONLY (OUTPATIENT)
Dept: ORTHOPEDIC SURGERY | Age: 55
End: 2020-06-10
Payer: MEDICARE

## 2020-06-10 PROCEDURE — 20611 DRAIN/INJ JOINT/BURSA W/US: CPT | Performed by: PHYSICIAN ASSISTANT

## 2020-06-10 RX ORDER — HYALURONATE SODIUM 10 MG/ML
40 SYRINGE (ML) INTRAARTICULAR ONCE
Status: COMPLETED | OUTPATIENT
Start: 2020-06-10 | End: 2020-06-10

## 2020-06-10 RX ADMIN — Medication 40 MG: at 13:20

## 2020-06-10 NOTE — PROGRESS NOTES
office note is accurate. Sherri Cuba, [facility]    This dictation was performed with a verbal recognition program [facility]) and it was checked for errors. It is possible that there are still dictated errors within this office note. If so, please bring any errors to my attention for an addendum. All efforts were made to ensure that this office note is accurate.

## 2020-06-11 ENCOUNTER — OFFICE VISIT (OUTPATIENT)
Dept: ORTHOPEDIC SURGERY | Age: 55
End: 2020-06-11
Payer: MEDICARE

## 2020-06-11 PROCEDURE — 95910 NRV CNDJ TEST 7-8 STUDIES: CPT | Performed by: PHYSICAL MEDICINE & REHABILITATION

## 2020-06-11 PROCEDURE — 95886 MUSC TEST DONE W/N TEST COMP: CPT | Performed by: PHYSICAL MEDICINE & REHABILITATION

## 2020-06-11 NOTE — PROGRESS NOTES
Ref.   ?[id number]       EMG Summary Table     Spontaneous MUAP Recruitment   Muscle Nerve Roots IA Fib PSW Fasc H.F. Amp Dur. PPP Pattern   R. Abductor pollicis brevis Median G1-H8 N None None None None N N N N   L. First dorsal interosseous Ulnar C8-T1 N None None None None N N N N   L. Cervical paraspinals Spinal C4-C8 N None None None None N N N N   R. Cervical paraspinals Spinal C4-C8 N None None None None N N N N   L. Deltoid Axillary C5-C6 N None None None None N N N N   R. Deltoid Axillary C5-C6 N None None None None N N N N   L. Triceps brachii Radial C6-C8 N None None None None N N N N   R. Triceps brachii Radial C6-C8 N None None None None N N N N   L. Biceps brachii Musculocutaneous C5-C6 N None None None None N N N N   R. Biceps brachii Musculocutaneous C5-C6 N None None None None N N N N   L. Pronator teres Median C6-C7 N None None None None N N N N   R. Pronator teres Median C6-C7 N None None None None N N N N   L. Extensor digitorum communis Radial C7-C8 N None None None None N N N N   R. Extensor digitorum communis Radial C7-C8 N None None None None N N N N         Summary: Right median SNAP and CMAP latencies are slowed across the wrist.  Left ulnar CMAP conduction velocity is slowed across the elbow. The remainder of the NCS and monopolar exam are normal.        Conclusion:  Abnormal examination. There is electrodiagnostic evidence of:    1. Mild to moderate right carpal tunnel syndrome  2. Mild left ulnar mononeuropathy at the elbow  3. No left carpal tunnel syndrome  4.  No evidence of any other left or right upper extremity mononeuropathy, plexopathy, or radiculopathy              Aylin Hinojosa MD    Board Certified Physical Medicine and Rehabilitation

## 2020-06-22 ENCOUNTER — OFFICE VISIT (OUTPATIENT)
Dept: ORTHOPEDIC SURGERY | Age: 55
End: 2020-06-22
Payer: MEDICARE

## 2020-06-22 VITALS — RESPIRATION RATE: 17 BRPM | WEIGHT: 293 LBS | BODY MASS INDEX: 45.99 KG/M2 | HEIGHT: 67 IN

## 2020-06-22 PROCEDURE — 99213 OFFICE O/P EST LOW 20 MIN: CPT | Performed by: ORTHOPAEDIC SURGERY

## 2020-06-22 PROCEDURE — 20526 THER INJECTION CARP TUNNEL: CPT | Performed by: ORTHOPAEDIC SURGERY

## 2020-06-22 PROCEDURE — L3908 WHO COCK-UP NONMOLDE PRE OTS: HCPCS | Performed by: ORTHOPAEDIC SURGERY

## 2020-06-22 RX ORDER — BETAMETHASONE SODIUM PHOSPHATE AND BETAMETHASONE ACETATE 3; 3 MG/ML; MG/ML
6 INJECTION, SUSPENSION INTRA-ARTICULAR; INTRALESIONAL; INTRAMUSCULAR; SOFT TISSUE ONCE
Status: COMPLETED | OUTPATIENT
Start: 2020-06-22 | End: 2020-06-22

## 2020-06-22 RX ORDER — LIDOCAINE HYDROCHLORIDE 10 MG/ML
0.5 INJECTION, SOLUTION INFILTRATION; PERINEURAL ONCE
Status: COMPLETED | OUTPATIENT
Start: 2020-06-22 | End: 2020-06-22

## 2020-06-22 RX ADMIN — LIDOCAINE HYDROCHLORIDE 0.5 ML: 10 INJECTION, SOLUTION INFILTRATION; PERINEURAL at 10:15

## 2020-06-22 RX ADMIN — BETAMETHASONE SODIUM PHOSPHATE AND BETAMETHASONE ACETATE 6 MG: 3; 3 INJECTION, SUSPENSION INTRA-ARTICULAR; INTRALESIONAL; INTRAMUSCULAR; SOFT TISSUE at 10:16

## 2020-06-22 RX ADMIN — BETAMETHASONE SODIUM PHOSPHATE AND BETAMETHASONE ACETATE 6 MG: 3; 3 INJECTION, SUSPENSION INTRA-ARTICULAR; INTRALESIONAL; INTRAMUSCULAR; SOFT TISSUE at 10:15

## 2020-06-22 NOTE — PROGRESS NOTES
Assessment: EMG documented mild right carpal tunnel syndrome with clinical evidence of left carpal tunnel syndrome. She also has mild left ulnar neuropathy at the elbow. On physical exam I think there may also be an element of double crush syndrome as she does have provocative maneuvers which are positive at the C-spine. Her rheumatoid lab profile is negative for rheumatoid factor and JODY but she has an elevated sed rate and C-reactive protein as well as uric acid level    Treatment Plan: Both diagnostically as well as therapeutically I am going to give her bilateral injections into the carpal tunnel today and bilateral wrist supports. We will have her keep close track of how much this helps her symptomatology and then will have her return in about 4 weeks and at that time we will determine whether she needs further C-spine work-up    Return in about 4 weeks (around 7/20/2020). History of Present Illness  Alona Allen is a 49 y.o. female. She is back for her EMG and lab results today. Location:  Bilateral hand pain and numbness Severity: burning, numbness, loss of  strength Duration: few years  Modifying factors: none Associated symptoms: numbness and tingling, burning sensations. Review of Systems  Pertinent items are noted in HPI  Denies fever chills, confusion and bowel and bladder active change  Complete Review of Systems reviewed from patient history form dated 10/23/2019 and available in the patients chart under the media tab. Vital Signs  Vitals:    06/22/20 0957   Resp: 17   Weight: (!) 305 lb (138.3 kg)   Height: 5' 7\" (1.702 m)     Body mass index is 47.77 kg/m².      Contributory History  Thyroid disorder    Medical History  Past Medical History:   Diagnosis Date    Anxiety     Arthritis     Asthma     Fibromyalgia     Hypertension     Migraine     Primary osteoarthritis of both knees     Sleep apnea     uses CPAP    Thyroid disease      Current Outpatient Medications on angioedema    Norvasc [Amlodipine Besylate]      Excessive urination    Verapamil      Make hair fall out  Make hair fall out     Social History     Socioeconomic History    Marital status:      Spouse name: Not on file    Number of children: Not on file    Years of education: Not on file    Highest education level: Not on file   Occupational History    Not on file   Social Needs    Financial resource strain: Not on file    Food insecurity     Worry: Not on file     Inability: Not on file    Transportation needs     Medical: Not on file     Non-medical: Not on file   Tobacco Use    Smoking status: Never Smoker    Smokeless tobacco: Never Used   Substance and Sexual Activity    Alcohol use:  Yes     Alcohol/week: 1.0 standard drinks     Types: 1 Glasses of wine per week     Comment: socially/ rare    Drug use: No    Sexual activity: Yes     Partners: Male   Lifestyle    Physical activity     Days per week: Not on file     Minutes per session: Not on file    Stress: Not on file   Relationships    Social connections     Talks on phone: Not on file     Gets together: Not on file     Attends Christianity service: Not on file     Active member of club or organization: Not on file     Attends meetings of clubs or organizations: Not on file     Relationship status: Not on file    Intimate partner violence     Fear of current or ex partner: Not on file     Emotionally abused: Not on file     Physically abused: Not on file     Forced sexual activity: Not on file   Other Topics Concern    Not on file   Social History Narrative    Not on file     Family History   Problem Relation Age of Onset    Heart Disease Mother     Cancer Mother     Heart Disease Maternal Grandfather     Heart Disease Father     Stroke Father        Physical Exam  Constitutional: Patient is fairly heavy  Mental Status: Alert and oriented  Skin: No rashes or erythema  Lymphatic: No lymphadenopathy    Hand Examination: Left hand

## 2020-06-30 ENCOUNTER — OFFICE VISIT (OUTPATIENT)
Dept: ORTHOPEDIC SURGERY | Age: 55
End: 2020-06-30
Payer: MEDICARE

## 2020-06-30 VITALS — BODY MASS INDEX: 44.41 KG/M2 | WEIGHT: 293 LBS | HEIGHT: 68 IN

## 2020-06-30 PROCEDURE — 99203 OFFICE O/P NEW LOW 30 MIN: CPT | Performed by: PODIATRIST

## 2020-07-11 ENCOUNTER — HOSPITAL ENCOUNTER (OUTPATIENT)
Dept: WOMENS IMAGING | Age: 55
Discharge: HOME | End: 2020-07-11
Payer: MEDICARE

## 2020-07-11 PROCEDURE — 77067 SCR MAMMO BI INCL CAD: CPT

## 2020-07-20 ENCOUNTER — OFFICE VISIT (OUTPATIENT)
Dept: ORTHOPEDIC SURGERY | Age: 55
End: 2020-07-20
Payer: MEDICARE

## 2020-07-20 VITALS — WEIGHT: 293 LBS | RESPIRATION RATE: 16 BRPM | HEIGHT: 68 IN | BODY MASS INDEX: 44.41 KG/M2

## 2020-07-20 PROCEDURE — 99213 OFFICE O/P EST LOW 20 MIN: CPT | Performed by: ORTHOPAEDIC SURGERY

## 2020-07-20 RX ORDER — SUMATRIPTAN 100 MG/1
100 TABLET, FILM COATED ORAL
COMMUNITY
Start: 2020-07-13 | End: 2020-12-22 | Stop reason: ALTCHOICE

## 2020-07-20 NOTE — PROGRESS NOTES
Assessment: Tremendous improvement bilaterally of carpal tunnel symptoms after single steroid injection bilaterally and bracing. She still has constant numbness though in her middle finger on the left side and she is also having a tremendous amount of neck discomfort and stiffness    Treatment Plan: We are going hemorrhages continue to use her wrist supports at night for the next month and then see her again with consideration of either another set of injections or doing a left carpal tunnel release. She is very concerned about her C-spine as well and I think we should have 1 of our physical medicine partners examine her    Return in about 4 weeks (around 8/17/2020) for Also consultation with Dr. Margarie Canavan or Dr. Frank Del Angel regarding her neck. History of Present Illness  Jason Barakat is a 49 y.o. female. She is back for a follow up for her bilateral carpal tunnel syndrome. She was given bilateral steroid injections which helped tremendously but her left middle finger is still having some numbness. Location:  Bilateral hands Severity: numbness Duration: few years  Modifying factors: rest, injections Associated symptoms: associated numbness and tingling. Review of Systems  Pertinent items are noted in HPI  Denies fever chills, confusion and bowel and bladder active change  Complete Review of Systems reviewed from patient history form dated 10/23/2019 and available in the patients chart under the media tab. Vital Signs  Vitals:    07/20/20 1008   Resp: 16   Weight: (!) 305 lb (138.3 kg)   Height: 5' 8\" (1.727 m)     Body mass index is 46.38 kg/m².      Contributory History  Arthritis    Medical History  Past Medical History:   Diagnosis Date    Anxiety     Arthritis     Asthma     Fibromyalgia     Hypertension     Migraine     Primary osteoarthritis of both knees     Sleep apnea     uses CPAP    Thyroid disease      Current Outpatient Medications on File Prior to Visit   Medication Sig Dispense Refill    SUMAtriptan (IMITREX) 100 MG tablet Take 100 mg by mouth once as needed      valACYclovir (VALTREX) 1 g tablet Take 1,000 mg by mouth 3 times daily      methylPREDNISolone (MEDROL, GURMEET,) 4 MG tablet Take by mouth. 1 kit 0    omeprazole (PRILOSEC) 20 MG delayed release capsule Take 1 capsule by mouth Daily 30 capsule 3    liothyronine (CYTOMEL) 5 MCG tablet Take 20 mcg by mouth daily      busPIRone (BUSPAR) 15 MG tablet Take 15 mg by mouth 2 times daily      acetaminophen (TYLENOL) 500 MG tablet Take 1,000 mg by mouth daily      cloNIDine (CATAPRES) 0.1 MG tablet Take 0.1 mg by mouth 2 times daily      Multiple Vitamins-Minerals (THERAPEUTIC MULTIVITAMIN-MINERALS) tablet Take 1 tablet by mouth daily      Vitamin D (CHOLECALCIFEROL) 1000 UNITS CAPS capsule Take 1,000 Units by mouth daily.  Topiramate (TOPAMAX PO) Take by mouth Take 50 mg in am and 25 in pm      montelukast (SINGULAIR) 10 MG tablet Take 10 mg by mouth nightly.  Fexofenadine HCl (ALLEGRA PO) Take 60 mg by mouth daily as needed       triamterene-hydrochlorothiazide (MAXZIDE) 75-50 MG per tablet Take 1 tablet by mouth daily. Taking 1/2 tab daily         ALPRAZolam (XANAX) 0.25 MG tablet Take 0.5 mg by mouth nightly as needed.  cyclobenzaprine (FLEXERIL) 10 MG tablet Take 5 mg by mouth nightly as needed.  fluticasone (FLONASE) 50 MCG/ACT nasal spray 1 spray by Nasal route daily.  vitamin B-12 (CYANOCOBALAMIN) 100 MCG tablet Take 50 mcg by mouth daily.  FLUoxetine HCl (PROZAC PO) Take 80 mg by mouth daily.  Levothyroxine Sodium (LEVOXYL PO) Take 50 mcg by mouth daily. Unknown dose      albuterol (PROVENTIL HFA;VENTOLIN HFA) 108 (90 BASE) MCG/ACT inhaler Inhale 2 puffs into the lungs every 6 hours as needed. No current facility-administered medications on file prior to visit.       Allergies   Allergen Reactions    Ace Inhibitors      angioedema    Amlodipine      Excessive urination  Lisinopril Swelling     angioedema    Norvasc [Amlodipine Besylate]      Excessive urination    Verapamil      Make hair fall out  Make hair fall out     Social History     Socioeconomic History    Marital status:      Spouse name: Not on file    Number of children: Not on file    Years of education: Not on file    Highest education level: Not on file   Occupational History    Not on file   Social Needs    Financial resource strain: Not on file    Food insecurity     Worry: Not on file     Inability: Not on file    Transportation needs     Medical: Not on file     Non-medical: Not on file   Tobacco Use    Smoking status: Never Smoker    Smokeless tobacco: Never Used   Substance and Sexual Activity    Alcohol use:  Yes     Alcohol/week: 1.0 standard drinks     Types: 1 Glasses of wine per week     Comment: socially/ rare    Drug use: No    Sexual activity: Yes     Partners: Male   Lifestyle    Physical activity     Days per week: Not on file     Minutes per session: Not on file    Stress: Not on file   Relationships    Social connections     Talks on phone: Not on file     Gets together: Not on file     Attends Gnosticist service: Not on file     Active member of club or organization: Not on file     Attends meetings of clubs or organizations: Not on file     Relationship status: Not on file    Intimate partner violence     Fear of current or ex partner: Not on file     Emotionally abused: Not on file     Physically abused: Not on file     Forced sexual activity: Not on file   Other Topics Concern    Not on file   Social History Narrative    Not on file     Family History   Problem Relation Age of Onset    Heart Disease Mother     Cancer Mother     Heart Disease Maternal Grandfather     Heart Disease Father     Stroke Father        Physical Exam  Constitutional: Normal nutritional status  Mental Status: Alert and oriented  Skin: No rashes or erythema  Lymphatic: No lymphadenopathy    Hand Examination: Examination of the left hand does still show positive Tinel's and Phalen's. Left intrinsic motor function is normal.    Examination the right hand shows minimally positive Tinel's and Phalen's    Additional Comments:     Additional Examinations:  X-Ray Findings:    Additional Diagnostic Test Findings: I reviewed her previous EMGs and she really just had mild bilateral carpal tunnel    Office Procedures:    Time Statement:I spent 15 minutes, face to face, and greater than 50% was spent counseling with the patient discussing treatment options and answering questions regarding the issue of possible double crush syndrome. We also discussed long-term treatment options for her carpal tunnel    This dictation was performed with a verbal recognition program. It is possible that there are still dictated errors within this office note. All efforts were made to ensure that this office note is accurate. No orders of the defined types were placed in this encounter. Attestation: I have reviewed the chief complaint and history of present illness (including ROS and PFSH) and vital documentation by my staff and I agree with their documentation and have added where applicable.

## 2020-07-29 ENCOUNTER — OFFICE VISIT (OUTPATIENT)
Dept: ORTHOPEDIC SURGERY | Age: 55
End: 2020-07-29
Payer: MEDICARE

## 2020-07-29 VITALS — BODY MASS INDEX: 44.41 KG/M2 | HEIGHT: 68 IN | WEIGHT: 293 LBS

## 2020-07-29 PROCEDURE — 99214 OFFICE O/P EST MOD 30 MIN: CPT | Performed by: PHYSICAL MEDICINE & REHABILITATION

## 2020-07-29 NOTE — PROGRESS NOTES
Follow up: [address]:    Chief Complaint   Patient presents with    Neck Pain     fu neck having pain and now having numbness in hands & fingers       HISTORY OF PRESENT ILLNESS:                The patient is a 48 y.o. female here to follow up. Have all seen her for low back issues. She has history of anxiety fibromyalgia gastric band surgery    Ports left mechanical neck pain described as constant worse with range improved numbness in her hands after carpal tunnel injections bilaterally. Recent EMG shows right carpal tunnel syndrome and left ulnar neuropathy. She is continue numbness over the long finger of her left hand. She has no weakness        Pain Assessment  Location of Pain: Neck  Severity of Pain: 5  Quality of Pain: Aching  Duration of Pain: Persistent  Frequency of Pain: Constant  Aggravating Factors: Bending, Stretching, Straightening, Other (Comment)  Limiting Behavior: Yes  Relieving Factors: Rest  Result of Injury: No  Work-Related Injury: No  Are there other pain locations you wish to document?: No      Past/Current Treatment     PT:   Chiro:  Injections:   Medications:            NSAIDS:             Muscle relaxer:              Steriods:              Neuropathic medications:              Opioids:            Other:  Surgery:       Past Medical History: Medical history form was reviewed today and scanned into the media tab. Past Medical History:   Diagnosis Date    Anxiety     Arthritis     Asthma     Fibromyalgia     Hypertension     Migraine     Primary osteoarthritis of both knees     Sleep apnea     uses CPAP    Thyroid disease         REVIEW OF SYSTEMS:   CONSTITUTIONAL: Denies unexplained weight loss, fevers, chills or fatigue  NEUROLOGIC: Denies tremors or seizures         PHYSICAL EXAM:    Vitals: Height 5' 8\" (1.727 m), weight (!) 305 lb (138.3 kg), not currently breastfeeding.     GENERAL EXAM:  · General Apparence: Patient is adequately groomed with no evidence of malnutrition. · Orientation: The patient is oriented to time, place and person. · Mood & Affect:The patient's mood and affect are appropriate   · Vascular: Examination reveals no swelling tenderness in upper or lower extremities. · Lymphatic: The lymphatic examination bilaterally reveals all areas to be without enlargement or induration  · Sensation: Sensation is intact without deficit  · Coordination/Balance: Good coordination     CERVICAL EXAMINATION:  · Inspection: Local inspection shows no step-off or bruising. Cervical alignment is normal.     · Palpation: No evidence of tenderness at the midline, and trapezius. Paraspinal tenderness is present. There is no step-off or paraspinal spasm. · Range of Motion: Mild loss of flexion and extension  · Strength: 5/5 bilateral upper extremities   · Special Tests:    ·   Spurling's, L'Hermitte's & Grover's negative bilaterally. ·   Delcid and Impingement tests are negative bilaterally. ·  Cubital and Carpal tunnel Tinel's negative bilaterally. · Skin:There are no rashes, ulcerations or lesions in right & left upper extremities. · Reflexes: Bilaterally triceps, biceps and brachioradialis are 2+. Clonus absent bilaterally at the feet. · Gait & station: Normal gait  · Additional Examinations:         · RIGHT UPPER EXTREMITY:  Inspection/examination of the right upper extremity does not show any tenderness, deformity or injury. Range of motion is full. There is no gross instability. There are no rashes, ulcerations or lesions. Strength and tone are normal.  · LEFT UPPER EXTREMITY: Inspection/examination of the left upper extremity does not show any tenderness, deformity or injury. Range of motion is full. There is no gross instability. There are no rashes, ulcerations or lesions.  Strength and tone are normal.    Diagnostic Testing    CBC, Chemistries reviewed 2020    4 views cervical spine 7/29/2020: DDD spondylosis C6-7 with left foraminal spondylitic spur  Previous lumbar MRI October 2019 shows L5-S1 DDD    2 views lumbar spine October 2019 show severe L5-S1 DDD    EMG June 2020 bilateral upper extremity shows right carpal tunnel syndrome, left ulnar neuropathy, no left CTS, no left or right cervical radiculopathy    Impression:  #1  Left mechanical neck pain, myofascial pain  Numbness left middle finger      Plan:  #1  Physical therapy program        RADHA Estrada

## 2020-08-06 ENCOUNTER — HOSPITAL ENCOUNTER (OUTPATIENT)
Dept: PHYSICAL THERAPY | Age: 55
Setting detail: THERAPIES SERIES
Discharge: HOME | End: 2020-08-06
Payer: MEDICARE

## 2020-08-06 PROCEDURE — 97110 THERAPEUTIC EXERCISES: CPT

## 2020-08-06 PROCEDURE — 97112 NEUROMUSCULAR REEDUCATION: CPT

## 2020-08-06 PROCEDURE — 97161 PT EVAL LOW COMPLEX 20 MIN: CPT

## 2020-08-06 PROCEDURE — 97140 MANUAL THERAPY 1/> REGIONS: CPT

## 2020-08-06 NOTE — FLOWSHEET NOTE
[facility] [facility], [address] [place] [address], [address] [place] [address] [address]  Phone: [phone]   Fax:     [phone]      Physical Therapy Treatment Note/ Progress Report:     Date:  2020    Patient Name:  Dev Kessler    :  1965  MRN: [id number]  Restrictions/Precautions:    Medical/Treatment Diagnosis Information:  · Diagnosis: M79.18 (ICD-10-CM) - Myofascial pain syndrome, cervical  · Treatment Diagnosis: neck pain with movement dysfunction  Insurance/Certification information:  PT Insurance Information: [address]  Physician Information:  Referring Practitioner: Dr. Curtis Verduzco  Has the plan of care been signed (Y/N):        []  Yes  [x]  No     Date of Patient follow up with Physician:  BRYCE      Is this a Progress Report:     []  Yes  [x]  No        If Yes:  Date Range for reporting period:  Beginning 20  Ending 20    Progress report will be due (10 Rx or 30 days whichever is less):        Recertification will be due (POC Duration  / 90 days whichever is less): 20       Visit # Insurance Allowable Auth Required   1 ?  [x]  Yes []  No        Functional Scale: NDI 54%    Date assessed:  20      Latex Allergy:  [x]NO      []YES  Preferred Language for Healthcare:   [x]English       []other:      Pain level:  3-8/10     SUBJECTIVE:  See eval    OBJECTIVE: See eval   Observation:    Test measurements:      RESTRICTIONS/PRECAUTIONS: HTN, OA    Exercises/Interventions:   Therapeutic Ex (69082) Sets/sec Reps Notes/CUES HEP   Levator S 10'' 5     UT S 10'' 5     scap pinches 2 10     TB rows 2 15     3 finger sweep 2 10     Chin tucks 2 10                                               Manual Intervention (42115)       C-spine PA mobs grade III-IV  Lateral glides   Manual traction   PROM  STM   MFR      8' reaching, carrying, lifting, house/yardwork, driving/computer work    Modalities:     [] GAME READY (VASO)- for significant edema, swelling, pain control. Hot pack x 5'    Charges:  Timed Code Treatment Minutes: 38   Total Treatment Minutes: 50      [x] EVAL (LOW) 89276 (typically 20 minutes face-to-face)  [] EVAL (MOD) 61460 (typically 30 minutes face-to-face)  [] EVAL (HIGH) 08707 (typically 45 minutes face-to-face)  [] RE-EVAL     [x] BG(60319) x 1    [] IONTO  [x] NMR (53225) x  1   [] VASO  [x] Manual (21991) x  1    [] Other:  [] TA x      [] Mech Traction (74001)  [] ES(attended) (98831)      [] ES (un) (12357):    ASSESSMENT:  See eval      GOALS:   Patient stated goal: Pt would like to decrease her pain. Therapist goals for Patient:   Short Term Goals: To be achieved in: 2 weeks  1. Independent in HEP and progression per patient tolerance, in order to prevent re-injury. [x] Progressing: [] Met: [] Not Met: [] Adjusted  2. Patient will have a decrease in pain to facilitate improvement in movement, function, and ADLs as indicated by Functional Deficits. [x] Progressing: [] Met: [] Not Met: [] Adjusted    Long Term Goals: To be achieved in: 12 weeks  1. Disability index score of 10% or less for the NDI to assist with reaching prior level of function. [x] Progressing: [] Met: [] Not Met: [] Adjusted  2. Patient will demonstrate increased AROM to [facility] of cervical/thoracic spine to allow for proper joint functioning as indicated by patients Functional Deficits. [x] Progressing: [] Met: [] Not Met: [] Adjusted  3. Patient will demonstrate an increase in postural awareness and control and activation of  Deep cervical stabilizers to allow for proper functional mobility as indicated by patients Functional Deficits. [x] Progressing: [] Met: [] Not Met: [] Adjusted  4. Patient will return to all functional activities without increased symptoms or restriction.    [x] Progressing: [] Met: [] Not Met: []

## 2020-08-06 NOTE — PLAN OF CARE
years prior to this most recent exacerbation. Relevant Medical History:  Functional Disability Index:  NDI 54%    Pain Scale: 3-8/10  Easing factors: ice, heat,   Provocative factors: flexion for prolonged periods     Type: [x]Constant   []Intermittent  []Radiating []Localized []other:     Numbness/Tingling: L hand    Occupation/School:disabled     Living Status/Prior Level of Function: Independent with ADLs and IADLs,     OBJECTIVE:     CERV ROM     Cervical Flexion WFL P! Cervical Extension WFL relief    Cervical SB R WFL relief  L WFL    Cervical rotation WFL P!         ROM Left Right   Shoulder Flex WFL all planes WFL all planes   Shoulder Abd     Shoulder ER     Shoulder IR               Strength  Left Right   Shoulder Flex 5/5 all planes 5/5 all planes   Shoulder Scap     Shoulder ER     Shoulder IR               Reflexes Left Right   C5-6 Biceps nt nt   C5-6 Brachioradialis nt nt   C7-8 Triceps nt nt     Reflexes/Sensation:    [x]Dermatomes/Myotomes intact    [x]Reflexes equal and normal bilaterally   []Other:    Joint mobility: C-spine   []Normal    [x]Hypo   []Hyper    Palpation: TTP over suboccipitals     Functional Mobility/Transfers: na    Posture: rounded shoulders fwd head. Bandages/Dressings/Incisions: na    Gait: (include devices/WB status): WNL     Orthopedic Special Tests: - alar, - sharps kian. -VBI                       [x] Patient history, allergies, meds reviewed. Medical chart reviewed. See intake form. Review Of Systems (ROS):  [x]Performed Review of systems (Integumentary, CardioPulmonary, Neurological) by intake and observation. Intake form has been scanned into medical record. Patient has been instructed to contact their primary care physician regarding ROS issues if not already being addressed at this time.       Co-morbidities/Complexities (which will affect course of rehabilitation):   []None           Arthritic conditions   []Rheumatoid arthritis (M05.9)  [x]Osteoarthritis (M19.91)   Cardiovascular conditions   [x]Hypertension (I10)  []Hyperlipidemia (E78.5)  []Angina pectoris (I20)  []Atherosclerosis (I70)   Musculoskeletal conditions   []Disc pathology   []Congenital spine pathologies   []Prior surgical intervention  []Osteoporosis (M81.8)  []Osteopenia (M85.8)   Endocrine conditions   []Hypothyroid (E03.9)  []Hyperthyroid Gastrointestinal conditions   []Constipation (M61.67)   Metabolic conditions   [x]Morbid obesity (E66.01)  []Diabetes type 1(E10.65) or 2 (E11.65)   []Neuropathy (G60.9)     Pulmonary conditions   []Asthma (J45)  []Coughing   []COPD (J44.9)   Psychological Disorders  []Anxiety (F41.9)  []Depression (F32.9)   []Other:   []Other:          Barriers to/and or personal factors that will affect rehab potential:              []Age  []Sex              []Motivation/Lack of Motivation                        []Co-Morbidities              []Cognitive Function, education/learning barriers              []Environmental, home barriers              []profession/work barriers  []past PT/medical experience  []other:  Justification:     Falls Risk Assessment (30 days):   [x] Falls Risk assessed and no intervention required.   [] Falls Risk assessed and Patient requires intervention due to being higher risk   TUG score (>12s at risk):     [] Falls education provided, including       G-Codes:       ASSESSMENT:    Functional Impairments:     [x]Noted cervical/thoracic/GHJ joint hypomobility   []Noted cervical/thoracic/GHJ joint hypermobility   [x]Decreased cervical/UE functional ROM   [x]Noted Headache pain aggravated by neck movements with/without dizziness   []Abnormal reflexes/sensation/myotomal/dermatomal deficits   []Decreased DCF control or ability to hold head up   [x]Decreased RC/scapular/core strength and neuromuscular control    [x]Decreased UE functional strength   []other:      Functional Activity Limitations (from functional questionnaire and intake)   [x]Reduced ability to tolerate prolonged functional positions   [x]Reduced ability or difficulty with changes of positions or transfers between positions   [x]Reduced ability to maintain good posture and demonstrate good body mechanics with sitting, bending, and lifting   [x] Reduced ability or tolerance with driving and/or computer work   [x]Reduced ability to perform lifting, reaching, carrying tasks   []Reduced ability to concentrate   [x]Reduced ability to sleep    []Reduced ability to tolerate any impact through UE or spine   [x]Reduced ability to ambulate prolonged functional periods/distances   []other:    Participation Restrictions   [x]Reduced participation in self care activities   [x]Reduced participation in home management activities   [x]Reduced participation in work activities   [x]Reduced participation in social activities. [x]Reduced participation in sport/recreational activities. Classification/Subgrouping:   [x]signs/symptoms consistent with neck pain with mobility deficits     []signs/symptoms consistent with neck pain with movement coordinated impairments    []signs/symptoms consistent with neck pain with radiating pain    [x]signs/symptoms consistent with neck pain with headaches (cervicogenic)    []Signs/symptoms consistent with nerve root involvement including myotome & dermatome dysfunction   [x]sign/symptoms consistent with facet dysfunction of cervical and thoracic spine    []signs/symptoms consistent suggesting central cord compression/UMN syndromes   []signs/symptoms consistent with discogenic cervical pain   []signs/symptoms consistent with rib dysfunction   [x]signs/symptoms consistent with postural dysfunction   []signs/symptoms consistent with shoulder pathology    []signs/symptoms consistent with post-surgical status including decreased ROM, strength and function.    []signs/symptoms consistent with pathology which may benefit from Dry Needling   []signs/symptoms which may limit the use of advanced manual therapy techniques: (Hypertension, recent trauma, intolerance to end range positions, prior TIA, visual issues, UE myotomes loss )     Prognosis/Rehab Potential:      []Excellent   [x]Good    []Fair   []Poor    Tolerance of evaluation/treatment:    []Excellent   [x]Good    []Fair   []Poor    Physical Therapy Evaluation Complexity Justification  [x] A history of present problem with:  [] no personal factors and/or comorbidities that impact the plan of care;  []1-2 personal factors and/or comorbidities that impact the plan of care  [x]3 personal factors and/or comorbidities that impact the plan of care  [x] An examination of body systems using standardized tests and measures addressing any of the following: body structures and functions (impairments), activity limitations, and/or participation restrictions;:  [] a total of 1-2 or more elements   [x] a total of 3 or more elements   [] a total of 4 or more elements   [x] A clinical presentation with:  [x] stable and/or uncomplicated characteristics   [] evolving clinical presentation with changing characteristics  [] unstable and unpredictable characteristics;   [x] Clinical decision making of [x] low, [] moderate, [] high complexity using standardized patient assessment instrument and/or measurable assessment of functional outcome. [x] EVAL (LOW) 87158 (typically 20 minutes face-to-face)  [] EVAL (MOD) 14448 (typically 30 minutes face-to-face)  [] EVAL (HIGH) 58154 (typically 45 minutes face-to-face)  [] RE-EVAL     PLAN:   Frequency/Duration:  2 days per week for 12 Weeks:  Interventions:  [x]  Therapeutic exercise including: strength training, ROM, for cervical spine,scapula, core and Upper extremity, including postural re-education. [x]  NMR activation and proprioception for Deep cervical flexors, periscapular and RC muscles and Core, including postural re-education.     [x]  Manual therapy as indicated for C/T spine, ribs, Soft tissue to include: Dry Needling/IASTM, STM, PROM, Gr I-IV mobilizations, manipulation. [x] Modalities as needed that may include: thermal agents, E-stim, Biofeedback, US, iontophoresis as indicated  [x] Patient education on joint protection, postural re-education, activity modification, progression of HEP. HEP instruction: (see scanned forms)    GOALS:  Patient stated goal: Pt would like to decrease her pain. Therapist goals for Patient:   Short Term Goals: To be achieved in: 2 weeks  1. Independent in HEP and progression per patient tolerance, in order to prevent re-injury. [x] Progressing: [] Met: [] Not Met: [] Adjusted  2. Patient will have a decrease in pain to facilitate improvement in movement, function, and ADLs as indicated by Functional Deficits. [x] Progressing: [] Met: [] Not Met: [] Adjusted    Long Term Goals: To be achieved in: 12 weeks  1. Disability index score of 10% or less for the NDI to assist with reaching prior level of function. [x] Progressing: [] Met: [] Not Met: [] Adjusted  2. Patient will demonstrate increased AROM to [facility] of cervical/thoracic spine to allow for proper joint functioning as indicated by patients Functional Deficits. [x] Progressing: [] Met: [] Not Met: [] Adjusted  3. Patient will demonstrate an increase in postural awareness and control and activation of  Deep cervical stabilizers to allow for proper functional mobility as indicated by patients Functional Deficits. [x] Progressing: [] Met: [] Not Met: [] Adjusted  4. Patient will return to all functional activities without increased symptoms or restriction. [x] Progressing: [] Met: [] Not Met: [] Adjusted  5.  Pt will demonstrate the ability to perform all ADL's with 0/10 pain. (patient specific functional goal)    [x] Progressing: [] Met: [] Not Met: [] Adjusted     Electronically signed by:  Vinod Lester, PT

## 2020-08-13 ENCOUNTER — APPOINTMENT (OUTPATIENT)
Dept: PHYSICAL THERAPY | Age: 55
End: 2020-08-13
Payer: MEDICARE

## 2020-08-20 ENCOUNTER — APPOINTMENT (OUTPATIENT)
Dept: PHYSICAL THERAPY | Age: 55
End: 2020-08-20
Payer: MEDICARE

## 2020-08-22 ENCOUNTER — APPOINTMENT (OUTPATIENT)
Dept: GENERAL RADIOLOGY | Age: 55
End: 2020-08-22
Payer: MEDICARE

## 2020-08-22 ENCOUNTER — HOSPITAL ENCOUNTER (EMERGENCY)
Age: 55
Discharge: HOME | End: 2020-08-23
Attending: EMERGENCY MEDICINE
Payer: MEDICARE

## 2020-08-22 LAB
A/G RATIO: 1.3 (ref 1.1–2.2)
ALBUMIN SERPL-MCNC: 4.4 G/DL (ref 3.4–5)
ALP BLD-CCNC: 134 U/L (ref 40–129)
ALT SERPL-CCNC: 22 U/L (ref 10–40)
ANION GAP SERPL CALCULATED.3IONS-SCNC: 12 MMOL/L (ref 3–16)
AST SERPL-CCNC: 38 U/L (ref 15–37)
BASOPHILS ABSOLUTE: 0.1 K/UL (ref 0–0.2)
BASOPHILS RELATIVE PERCENT: 0.8 %
BILIRUB SERPL-MCNC: 0.3 MG/DL (ref 0–1)
BILIRUBIN URINE: NEGATIVE
BLOOD, URINE: NEGATIVE
BUN BLDV-MCNC: 15 MG/DL (ref 7–20)
CALCIUM SERPL-MCNC: 9.4 MG/DL (ref 8.3–10.6)
CHLORIDE BLD-SCNC: 102 MMOL/L (ref 99–110)
CLARITY: CLEAR
CO2: 24 MMOL/L (ref 21–32)
COLOR: YELLOW
CREAT SERPL-MCNC: 1.2 MG/DL (ref 0.6–1.1)
EOSINOPHILS ABSOLUTE: 0.2 K/UL (ref 0–0.6)
EOSINOPHILS RELATIVE PERCENT: 2.8 %
GFR AFRICAN AMERICAN: 57
GFR NON-AFRICAN AMERICAN: 47
GLOBULIN: 3.3 G/DL
GLUCOSE BLD-MCNC: 97 MG/DL (ref 70–99)
GLUCOSE URINE: NEGATIVE MG/DL
HCT VFR BLD CALC: 41.1 % (ref 36–48)
HEMOGLOBIN: 13.6 G/DL (ref 12–16)
KETONES, URINE: NEGATIVE MG/DL
LEUKOCYTE ESTERASE, URINE: NEGATIVE
LYMPHOCYTES ABSOLUTE: 1.8 K/UL (ref 1–5.1)
LYMPHOCYTES RELATIVE PERCENT: 23.9 %
MCH RBC QN AUTO: 28.3 PG (ref 26–34)
MCHC RBC AUTO-ENTMCNC: 33.1 G/DL (ref 31–36)
MCV RBC AUTO: 85.4 FL (ref 80–100)
MICROSCOPIC EXAMINATION: NORMAL
MONOCYTES ABSOLUTE: 0.6 K/UL (ref 0–1.3)
MONOCYTES RELATIVE PERCENT: 7.8 %
NEUTROPHILS ABSOLUTE: 4.9 K/UL (ref 1.7–7.7)
NEUTROPHILS RELATIVE PERCENT: 64.7 %
NITRITE, URINE: NEGATIVE
PDW BLD-RTO: 14.8 % (ref 12.4–15.4)
PH UA: 7 (ref 5–8)
PLATELET # BLD: 239 K/UL (ref 135–450)
PMV BLD AUTO: 9.3 FL (ref 5–10.5)
POTASSIUM SERPL-SCNC: 4.4 MMOL/L (ref 3.5–5.1)
PROTEIN UA: NEGATIVE MG/DL
RBC # BLD: 4.81 M/UL (ref 4–5.2)
SODIUM BLD-SCNC: 138 MMOL/L (ref 136–145)
SPECIFIC GRAVITY UA: <=1.005 (ref 1–1.03)
TOTAL PROTEIN: 7.7 G/DL (ref 6.4–8.2)
TROPONIN: <0.01 NG/ML
URINE REFLEX TO CULTURE: NORMAL
URINE TYPE: NORMAL
UROBILINOGEN, URINE: 0.2 E.U./DL
WBC # BLD: 7.6 K/UL (ref 4–11)

## 2020-08-22 PROCEDURE — 81003 URINALYSIS AUTO W/O SCOPE: CPT

## 2020-08-22 PROCEDURE — 80053 COMPREHEN METABOLIC PANEL: CPT

## 2020-08-22 PROCEDURE — 93005 ELECTROCARDIOGRAM TRACING: CPT | Performed by: EMERGENCY MEDICINE

## 2020-08-22 PROCEDURE — 99284 EMERGENCY DEPT VISIT MOD MDM: CPT

## 2020-08-22 PROCEDURE — 83880 ASSAY OF NATRIURETIC PEPTIDE: CPT

## 2020-08-22 PROCEDURE — 84484 ASSAY OF TROPONIN QUANT: CPT

## 2020-08-22 PROCEDURE — 6360000002 HC RX W HCPCS: Performed by: PHYSICIAN ASSISTANT

## 2020-08-22 PROCEDURE — 6370000000 HC RX 637 (ALT 250 FOR IP): Performed by: PHYSICIAN ASSISTANT

## 2020-08-22 PROCEDURE — 85025 COMPLETE CBC W/AUTO DIFF WBC: CPT

## 2020-08-22 PROCEDURE — 96374 THER/PROPH/DIAG INJ IV PUSH: CPT

## 2020-08-22 PROCEDURE — 71046 X-RAY EXAM CHEST 2 VIEWS: CPT

## 2020-08-22 RX ORDER — ONDANSETRON 2 MG/ML
4 INJECTION INTRAMUSCULAR; INTRAVENOUS ONCE
Status: COMPLETED | OUTPATIENT
Start: 2020-08-22 | End: 2020-08-22

## 2020-08-22 RX ORDER — ACETAMINOPHEN 325 MG/1
650 TABLET ORAL ONCE
Status: COMPLETED | OUTPATIENT
Start: 2020-08-22 | End: 2020-08-22

## 2020-08-22 RX ADMIN — ACETAMINOPHEN 650 MG: 325 TABLET ORAL at 21:38

## 2020-08-22 RX ADMIN — ONDANSETRON HYDROCHLORIDE 4 MG: 2 INJECTION, SOLUTION INTRAMUSCULAR; INTRAVENOUS at 21:38

## 2020-08-22 ASSESSMENT — ENCOUNTER SYMPTOMS
ABDOMINAL PAIN: 0
VOMITING: 0
COUGH: 0
SHORTNESS OF BREATH: 1
BACK PAIN: 1
BLURRED VISION: 0

## 2020-08-22 ASSESSMENT — PAIN DESCRIPTION - DESCRIPTORS: DESCRIPTORS: ACHING

## 2020-08-22 ASSESSMENT — PAIN DESCRIPTION - PAIN TYPE: TYPE: ACUTE PAIN

## 2020-08-22 ASSESSMENT — PAIN SCALES - GENERAL: PAINLEVEL_OUTOF10: 5

## 2020-08-22 ASSESSMENT — PAIN DESCRIPTION - PROGRESSION: CLINICAL_PROGRESSION: GRADUALLY WORSENING

## 2020-08-22 ASSESSMENT — PAIN DESCRIPTION - FREQUENCY: FREQUENCY: CONTINUOUS

## 2020-08-22 ASSESSMENT — PAIN DESCRIPTION - ONSET: ONSET: GRADUAL

## 2020-08-22 ASSESSMENT — PAIN DESCRIPTION - LOCATION: LOCATION: HEAD

## 2020-08-23 VITALS
DIASTOLIC BLOOD PRESSURE: 82 MMHG | WEIGHT: 293 LBS | HEIGHT: 68 IN | SYSTOLIC BLOOD PRESSURE: 132 MMHG | BODY MASS INDEX: 44.41 KG/M2 | HEART RATE: 50 BPM | RESPIRATION RATE: 11 BRPM | OXYGEN SATURATION: 94 % | TEMPERATURE: 98.4 F

## 2020-08-23 LAB
EKG ATRIAL RATE: 54 BPM
EKG DIAGNOSIS: NORMAL
EKG P AXIS: 39 DEGREES
EKG P-R INTERVAL: 192 MS
EKG Q-T INTERVAL: 502 MS
EKG QRS DURATION: 86 MS
EKG QTC CALCULATION (BAZETT): 476 MS
EKG R AXIS: 8 DEGREES
EKG T AXIS: 17 DEGREES
EKG VENTRICULAR RATE: 54 BPM
PRO-BNP: 360 PG/ML (ref 0–124)
SARS-COV-2, PCR: NOT DETECTED

## 2020-08-23 PROCEDURE — U0003 INFECTIOUS AGENT DETECTION BY NUCLEIC ACID (DNA OR RNA); SEVERE ACUTE RESPIRATORY SYNDROME CORONAVIRUS 2 (SARS-COV-2) (CORONAVIRUS DISEASE [COVID-19]), AMPLIFIED PROBE TECHNIQUE, MAKING USE OF HIGH THROUGHPUT TECHNOLOGIES AS DESCRIBED BY CMS-2020-01-R: HCPCS

## 2020-08-23 PROCEDURE — 93010 ELECTROCARDIOGRAM REPORT: CPT | Performed by: INTERNAL MEDICINE

## 2020-08-23 ASSESSMENT — PAIN DESCRIPTION - PAIN TYPE: TYPE: ACUTE PAIN

## 2020-08-23 ASSESSMENT — PAIN SCALES - GENERAL: PAINLEVEL_OUTOF10: 2

## 2020-08-23 NOTE — ED PROVIDER NOTES
[address] ED  EMERGENCY DEPARTMENT ENCOUNTER        Pt Name: Jose Beaver  MRN: [id number]  [place] 1965  Date of evaluation: 8/22/2020  Provider: Katy Olmos PA-C  PCP: DON Goel CNP    Shared Visit or Autonomous Visit:  I have seen and evaluated this patient with my supervising physician Adela Wilkinson, [facility]       Chief Complaint   Patient presents with    Hypertension     pt c/o higher BP and lower HR than normal for her. Pt states started on Friday. Pt c/o increased thirst and urination. Pt also c/o headache and dizziness. HISTORY OF PRESENT ILLNESS   (Location/Symptom, Timing/Onset, Context/Setting, Quality, Duration, Modifying Factors, Severity)  Note limiting factors. Jose Beaver is a 48 y.o. female presenting to the emergency department with complaint of feeling bad since yesterday states she has had intermittent headaches, states her blood pressure has been elevated maximum at 163/80 states her heart rate has been low lowest of 42 and in the 50s, states she feels lightheaded at times also having urinary frequency and increased thirst.  No known diabetes. No syncopal episodes. Denies any chest pain. States feels like she cannot get a good breath and states think some of this may be her anxiety. No known heart problems. Non-smoker. She has essential hypertension and takes clonidine and triamterene/HCTZ states she did forget to take her triamterene/HCTZ for 4 days because it was in another back she did not have with her restarted this medication yesterday. She called her family doctor yesterday and has an appointment arranged for Monday. States headache is better now. Hx migraines takes imitrex. C/o backache. The history is provided by the patient.    Hypertension   Onset quality:  Gradual  Duration:  2 days  Timing:  Intermittent  Chronicity:  New  Associated symptoms: anxiety, dizziness, headaches and shortness of breath Associated symptoms: no abdominal pain, no blurred vision, no chest pain, no confusion, no fever, no loss of consciousness, no peripheral edema, no syncope, not vomiting and no weakness    Risk factors: no cardiac disease, no diabetes and no tobacco use          Nursing Notes were reviewed    REVIEW OF SYSTEMS    (2-9 systems for level 4, 10 or more for level 5)     Review of Systems   Constitutional: Negative for fever. HENT: Negative for congestion. Eyes: Negative for blurred vision and visual disturbance. Respiratory: Positive for shortness of breath. Negative for cough. Cardiovascular: Negative for chest pain, leg swelling and syncope. Gastrointestinal: Negative for abdominal pain and vomiting. Genitourinary: Positive for frequency. Negative for dysuria. Musculoskeletal: Positive for back pain. Neurological: Positive for dizziness, light-headedness and headaches. Negative for loss of consciousness, syncope, facial asymmetry, speech difficulty, weakness and numbness. Psychiatric/Behavioral: Negative for confusion. The patient is nervous/anxious. All other systems reviewed and are negative. Positives and Pertinent negatives as per HPI.        PAST MEDICAL HISTORY     Past Medical History:   Diagnosis Date    Anxiety     Arthritis     Asthma     Fibromyalgia     Hypertension     Migraine     Primary osteoarthritis of both knees     Sleep apnea     uses CPAP    Thyroid disease          SURGICAL HISTORY     Past Surgical History:   Procedure Laterality Date    CHOLECYSTECTOMY  10/14/2014    Open cholecystectomy and hepatico jejunostomy, estela-en-y    COLONOSCOPY  06/01/2017    polyp    ENDOMETRIAL ABLATION  02/2013    KNEE ARTHROSCOPY Left 2/23/16    lateral menisectomoy chondroplasty synovectomy    LAP BAND      THYROIDECTOMY, PARTIAL      UPPER GASTROINTESTINAL ENDOSCOPY N/A 9/13/2019    EGD BIOPSY performed by Jody Houston MD at [address] [place] [address] CURRENTMEDICATIONS       Previous Medications    ACETAMINOPHEN (TYLENOL) 500 MG TABLET    Take 1,000 mg by mouth daily    ALBUTEROL (PROVENTIL HFA;VENTOLIN HFA) 108 (90 BASE) MCG/ACT INHALER    Inhale 2 puffs into the lungs every 6 hours as needed. ALPRAZOLAM (XANAX) 0.25 MG TABLET    Take 0.5 mg by mouth nightly as needed. BUSPIRONE (BUSPAR) 15 MG TABLET    Take 15 mg by mouth 2 times daily    CLONIDINE (CATAPRES) 0.1 MG TABLET    Take 0.1 mg by mouth 2 times daily    CYCLOBENZAPRINE (FLEXERIL) 10 MG TABLET    Take 5 mg by mouth nightly as needed. FEXOFENADINE HCL (ALLEGRA PO)    Take 60 mg by mouth daily as needed     FLUOXETINE HCL (PROZAC PO)    Take 80 mg by mouth daily. FLUTICASONE (FLONASE) 50 MCG/ACT NASAL SPRAY    1 spray by Nasal route daily. LEVOTHYROXINE SODIUM (LEVOXYL PO)    Take 50 mcg by mouth daily. Unknown dose    LIOTHYRONINE (CYTOMEL) 5 MCG TABLET    Take 20 mcg by mouth daily    METHYLPREDNISOLONE (MEDROL, GURMEET,) 4 MG TABLET    Take by mouth. MONTELUKAST (SINGULAIR) 10 MG TABLET    Take 10 mg by mouth nightly. MULTIPLE VITAMINS-MINERALS (THERAPEUTIC MULTIVITAMIN-MINERALS) TABLET    Take 1 tablet by mouth daily    OMEPRAZOLE (PRILOSEC) 20 MG DELAYED RELEASE CAPSULE    Take 1 capsule by mouth Daily    SUMATRIPTAN (IMITREX) 100 MG TABLET    Take 100 mg by mouth once as needed    TRIAMTERENE-HYDROCHLOROTHIAZIDE (MAXZIDE) 75-50 MG PER TABLET    Take 1 tablet by mouth daily. Taking 1/2 tab daily       VALACYCLOVIR (VALTREX) 1 G TABLET    Take 1,000 mg by mouth 3 times daily    VITAMIN B-12 (CYANOCOBALAMIN) 100 MCG TABLET    Take 50 mcg by mouth daily. VITAMIN D (CHOLECALCIFEROL) 1000 UNITS CAPS CAPSULE    Take 1,000 Units by mouth daily. ALLERGIES     Ace inhibitors;  Amlodipine; Lisinopril; Norvasc [amlodipine besylate]; and Verapamil    FAMILYHISTORY       Family History   Problem Relation Age of Onset    Heart Disease Mother     Cancer Mother    [place] Self reviewed. Constitutional:       Appearance: She is well-developed. She is not toxic-appearing. HENT:      Head: Normocephalic and atraumatic. Mouth/Throat:      Mouth: Mucous membranes are moist.      Pharynx: Oropharynx is clear. No pharyngeal swelling, oropharyngeal exudate or posterior oropharyngeal erythema. Eyes:      Extraocular Movements: Extraocular movements intact. Conjunctiva/sclera: Conjunctivae normal.      Pupils: Pupils are equal, round, and reactive to light. Neck:      Musculoskeletal: Normal range of motion and neck supple. No neck rigidity. Vascular: No JVD. Cardiovascular:      Rate and Rhythm: Normal rate and regular rhythm. Pulses: Normal pulses. Radial pulses are 2+ on the right side and 2+ on the left side. Posterior tibial pulses are 2+ on the right side and 2+ on the left side. Heart sounds: Normal heart sounds. Pulmonary:      Effort: Pulmonary effort is normal. No respiratory distress. Breath sounds: Normal breath sounds. No stridor. No wheezing, rhonchi or rales. Abdominal:      General: Bowel sounds are normal. There is no distension. Palpations: Abdomen is soft. Abdomen is not rigid. There is no mass. Tenderness: There is no abdominal tenderness. There is no guarding or rebound. Musculoskeletal: Normal range of motion. General: No tenderness. Right lower leg: No edema. Left lower leg: No edema. Comments: No calf tenderness or swelling. Skin:     General: Skin is warm and dry. Findings: No rash. Neurological:      General: No focal deficit present. Mental Status: She is alert and oriented to person, place, and time. GCS: GCS eye subscore is 4. GCS verbal subscore is 5. GCS motor subscore is 6. Cranial Nerves: Cranial nerves are intact. No cranial nerve deficit. Sensory: Sensation is intact. No sensory deficit. Motor: Motor function is intact.  No abnormal muscle tone. Coordination: Coordination is intact. Romberg sign negative. Coordination normal. Finger-Nose-Finger Test and Heel to [place] elías Test normal.      Comments: Cranial facial musculature and sensation are intact. the pt has normal ROM neck with no nuchal rigidity or meningismus. Pt has intact finger to nose and intact visual fields grossly intact bilaterally. Intact sensation. Equal strength 5 out of 5 symmetric upper and lower extremities. She holds extremities off the bed to a count of 10. Holds arms out extended and supinated without any pronation or drift. She touches heel to opposite shin around to down either side without any difficulty. Psychiatric:         Mood and Affect: Mood is anxious. Behavior: Behavior normal.         NIH Stroke Scale       1a  Level of consciousness: 0=alert; keenly responsive   1b. LOC questions:  0=Performs both tasks correctly   1c. LOC commands: 0=Performs both tasks correctly   2. Best Gaze: 0=normal   3. Visual: 0=No visual loss   4. Facial Palsy: 0=Normal symmetric movement   5a. Motor left arm: 0=No drift, limb holds 90 (or 45) degrees for full 10 seconds   5b. Motor right arm: 0=No drift, limb holds 90 (or 45) degrees for full 10 seconds   6a. motor left le=No drift, limb holds 90 (or 45) degrees for full 10 seconds   6b  Motor right le=No drift, limb holds 90 (or 45) degrees for full 10 seconds   7. Limb Ataxia: 0=Absent   8. Sensory: 0=Normal; no sensory loss   9. Best Language:  0=No aphasia, normal   10. Dysarthria: 0=Normal   11.  Extinction and Inattention: 0=No abnormality         Total:  0       DIAGNOSTIC RESULTS   LABS:    Labs Reviewed   COMPREHENSIVE METABOLIC PANEL - Abnormal; Notable for the following components:       Result Value    CREATININE 1.2 (*)     GFR Non- 47 (*)     GFR African American 57 (*)     Alkaline Phosphatase 134 (*)     AST 38 (*)     All other components within normal limits    Narrative: Performed at:  [facility],  [place]   Phone [phone]   CBC WITH AUTO DIFFERENTIAL    Narrative:     Performed at:  [facility],  [place], [facility]   Phone [phone]   TROPONIN    Narrative:     Performed at:  [facility],  [place], [facility]   Phone [phone]   URINE RT REFLEX TO CULTURE    Narrative:     Performed at:  [facility],  [place], [facility]   Phone [phone]   BRAIN NATRIURETIC PEPTIDE   COVID-19     Results for orders placed or performed during the hospital encounter of 08/22/20   CBC auto differential   Result Value Ref Range    WBC 7.6 4.0 - 11.0 K/uL    RBC 4.81 4.00 - 5.20 M/uL    Hemoglobin 13.6 12.0 - 16.0 g/dL    Hematocrit 41.1 36.0 - 48.0 %    MCV 85.4 80.0 - 100.0 fL    MCH 28.3 26.0 - 34.0 pg    MCHC 33.1 31.0 - 36.0 g/dL    RDW 14.8 12.4 - 15.4 %    Platelets 368 305 - 688 K/uL    MPV 9.3 5.0 - 10.5 fL    Neutrophils % 64.7 %    Lymphocytes % 23.9 %    Monocytes % 7.8 %    Eosinophils % 2.8 %    Basophils % 0.8 %    Neutrophils Absolute 4.9 1.7 - 7.7 K/uL    Lymphocytes Absolute 1.8 1.0 - 5.1 K/uL    Monocytes Absolute 0.6 0.0 - 1.3 K/uL    Eosinophils Absolute 0.2 0.0 - 0.6 K/uL    Basophils Absolute 0.1 0.0 - 0.2 K/uL   Comprehensive metabolic panel   Result Value Ref Range    Sodium 138 136 - 145 mmol/L    Potassium 4.4 3.5 - 5.1 mmol/L    Chloride 102 99 - 110 mmol/L    CO2 24 21 - 32 mmol/L    Anion Gap 12 3 - 16    Glucose 97 70 - 99 mg/dL    BUN 15 7 - 20 mg/dL    CREATININE 1.2 (H) 0.6 - 1.1 mg/dL    GFR Non- 47 (A) >60    GFR  57 (A) >60    Calcium 9.4 8.3 - 10.6 mg/dL    Total Protein 7.7 6.4 - 8.2 g/dL    Alb 4.4 3.4 - 5.0 g/dL    Albumin/Globulin Ratio 1.3 1.1 - 2.2    Total Bilirubin 0.3 0.0 - 1.0 mg/dL Alkaline Phosphatase 134 (H) 40 - 129 U/L    ALT 22 10 - 40 U/L    AST 38 (H) 15 - 37 U/L    Globulin 3.3 g/dL   Troponin   Result Value Ref Range    Troponin <0.01 <0.01 ng/mL   Urinalysis Reflex to Culture    Specimen: Urine, clean catch   Result Value Ref Range    Color, UA Yellow Straw/Yellow    Clarity, UA Clear Clear    Glucose, Ur Negative Negative mg/dL    Bilirubin Urine Negative Negative    Ketones, Urine Negative Negative mg/dL    Specific Gravity, UA <=1.005 1.005 - 1.030    Blood, Urine Negative Negative    pH, UA 7.0 5.0 - 8.0    Protein, UA Negative Negative mg/dL    Urobilinogen, Urine 0.2 <2.0 E.U./dL    Nitrite, Urine Negative Negative    Leukocyte Esterase, Urine Negative Negative    Microscopic Examination Not Indicated     Urine Type NotGiven     Urine Reflex to Culture Not Indicated    EKG 12 Lead   Result Value Ref Range    Ventricular Rate 54 BPM    Atrial Rate 54 BPM    P-R Interval 192 ms    QRS Duration 86 ms    Q-T Interval 502 ms    QTc Calculation (Bazett) 476 ms    P Axis 39 degrees    R Axis 8 degrees    T Axis 17 degrees    Diagnosis       Sinus bradycardiaOtherwise normal ECGWhen compared with ECG of 25-SEP-2014 11:13,Inverted T waves have replaced nonspecific T wave abnormality in Inferior leadsNonspecific T wave abnormality now evident in Lateral leads       All other labs were within normal range or not returned as of this dictation. EKG: All EKG's are interpreted by the Emergency Department Physician in the absence of a cardiologist.  Please see their note for interpretation of EKG. RADIOLOGY:   Non-plain film images such as CT, Ultrasound and MRI are read by the radiologist. Jennifer Andrez radiographic images are visualized andpreliminarily interpreted by the  ED Provider with the below findings:        Interpretation [facility] Radiologist below, if available at the time of this note:    XR CHEST (2 VW)   Final Result   No acute airspace disease identified.            Xr Chest (2 Supports early discharge with appropriate follow-up   4-6 Adverse outcome risk: 20.3% (moderate risk) Supports admission with standard rule-out management and stress testing   7-10 Adverse outcome risk: 72.7% (high to very high risk) Risk in first 30 days >50% Supports early aggressive management and typically with cardiac catheterization       Heart score: 2 for age and risk factors. This falls under the following category: Score of 0-3, which indicates a very low risk for major adverse cardiac event and supports early discharge            12:24 AM EDT  Recheck patient. Stable. EKG sinus bradycardia rate 54. No acute ischemia. Troponin is normal.  Electrolytes are normal.  Glucose 97. Creatinine 1.2 stable from prior history of renal insufficiency she sees a kidney specialist.  Chest x-ray is negative. No significant abnormality found on testing here. She is overall well-appearing here. Blood pressure 157/90. She has longstanding history of essential hypertension. She did not take her triamterene/HCTZ for 4 days and restarted this yesterday discussed this may be cause for some of her symptoms and her elevated blood pressure. She is complaining of urinary frequency this may be due to going back on the HCTZ. No signs of urinary tract infection. Do not suspect diabetes as her glucose is 97 here. She has multiple complaints. Current COVID-19 pandemic offered COVID swab she consents this will be sent. She has an appointment arranged with her doctor on Monday, in less than 48 hours she will keep this point for close follow-up and advise returning to the emergency department for any worsening or changes. She understands and agrees. I estimate there is LOW risk for PULMONARY EMBOLISM, ACUTE CORONARY SYNDROME, OR THORACIC AORTIC DISSECTION, thus I consider the discharge disposition reasonable.    I estimate there is LOW risk for SUBARACHNOID HEMORRHAGE, MENINGITIS, INTRACRANIAL HEMORRHAGE, SUBDURAL OR EPIDURAL HEMATOMA, OR STROKE, thus I consider the discharge disposition reasonable. FINAL IMPRESSION      1. Essential hypertension    2. Sinus bradycardia    3. Headache, unspecified headache type    4. Urinary frequency    5.  Renal insufficiency          DISPOSITION/PLAN   DISPOSITION     PATIENT REFERREDTO:  Matilde Jeffery, APRN - CNP    [address]  [phone]    In 2 days  as previously scheduled    [facility] ED  [address]  [phone]    If symptoms worsen      DISCHARGE MEDICATIONS:  New Prescriptions    No medications on file       DISCONTINUED MEDICATIONS:  Discontinued Medications    TOPIRAMATE (TOPAMAX PO)    Take by mouth Take 50 mg in am and 25 in pm              (Please note that portions ofthis note were completed with a voice recognition program.  Efforts were made to edit the dictations but occasionally words are mis-transcribed.)    Minda Joseph PA-C (electronically signed)            Allegra Fierro PA-C  08/23/20 0348

## 2020-08-23 NOTE — ED PROVIDER NOTES
I independently performed a history and physical on Pietro Sears. All diagnostic, treatment, and disposition decisions were made by myself in conjunction with the advanced practice provider. For further details of [id number] [facility] emergency department encounter, please see LUCAS Longo's note for full details of patient's visit. Patient presents to the emergency department complaining of \"not feeling right. \"Patient reports that her blood pressure has been elevated at home, though she does admit that she has missed a couple of days of her medications and then restarted. Patient states that she has been feeling very thirsty lately with frequent urination. Mild nausea has been noted. Physical exam: General: No acute distress. Heart: Mild bradycardia. .  Normal S1-S2. Lungs: Clear to auscultation bilaterally. No wheeze, rales, rhonchi. Abdomen: Soft. Nontender nondistended. No rebound, guarding. Extremities: No swelling or edema. EKG: Sinus bradycardia with a rate of 54. Normal axis. Normal intervals and durations. No ST or T wave changes appreciated. No acute signs of ischemia. Sinus bradycardia has replaced normal sinus rhythm from previous EKG on September 25, 2014. Assessment/plan:    1. Essential hypertension    2. Sinus bradycardia    3. Headache, unspecified headache type    4. Urinary frequency    5.  Renal insufficiency           Dejah Coyle DO  08/23/20 [id number]

## 2020-08-23 NOTE — ED TRIAGE NOTES
Chief Complaint   Patient presents with    Hypertension     pt c/o higher BP and lower HR than normal for her. Pt states started on Friday. Pt c/o increased thirst and urination. Pt also c/o headache and dizziness.

## 2020-08-27 ENCOUNTER — APPOINTMENT (OUTPATIENT)
Dept: PHYSICAL THERAPY | Age: 55
End: 2020-08-27
Payer: MEDICARE

## 2020-12-09 ENCOUNTER — TELEPHONE (OUTPATIENT)
Dept: ORTHOPEDIC SURGERY | Age: 55
End: 2020-12-09

## 2020-12-21 ENCOUNTER — OFFICE VISIT (OUTPATIENT)
Dept: ORTHOPEDIC SURGERY | Age: 55
End: 2020-12-21
Payer: MEDICARE

## 2020-12-21 PROCEDURE — 20611 DRAIN/INJ JOINT/BURSA W/US: CPT | Performed by: PHYSICIAN ASSISTANT

## 2020-12-21 NOTE — PROGRESS NOTES
Visco 3 #1 bilateral knee  Osteoarthritis of the bilateral knees    The patient is symptomatic from osteoarthritis of the Right and LEFT knee joint with documented radiological signs of arthritis. The patient has also failed 3 months of conservative treatment including home exercise, education, Tylenol and/or NSAIDs use. The patient was offered a Visco supplementation today. Risks, benefits, and alternatives to the injections were discussed in detail with the patient. The risks discussed included but are not limited to infection, skin reactions, hot swollen joints, and anaphylaxis. The patient gave verbal informed consent for the injection. The patient's skin was prepped with  3 sterile gauze  pads soaked with alcohol solution and the knee joint was injected with 2cc Visco 3  Rightand LEFT intra-articularly under sterile conditions. Technique: Under sterile conditions a [employer] Interactive ultrasound unit with a variable frequency (6.0-15.0 MHz) linear transducer was used to localize the placement of a 22-gauge needle into the Right and Left  knee joint. Findings: Successful needle placement for intra-articular Visco supplementation injection. Final images were taken and saved for permanent record. The patient tolerated the injection reasonably well. The patient was given instructions to ice the kne and avoid strenuous activities for 24-48 hours. The patient was instructed to call the office immediately if there is increased pain, redness, warmth, fever, or chills. We will see the patient back in one week for their second injection.

## 2020-12-22 ENCOUNTER — APPOINTMENT (OUTPATIENT)
Dept: GENERAL RADIOLOGY | Age: 55
End: 2020-12-22
Payer: MEDICARE

## 2020-12-22 ENCOUNTER — HOSPITAL ENCOUNTER (EMERGENCY)
Age: 55
Discharge: ANOTHER HOSPITAL | End: 2020-12-23
Attending: EMERGENCY MEDICINE
Payer: MEDICARE

## 2020-12-22 ENCOUNTER — APPOINTMENT (OUTPATIENT)
Dept: CT IMAGING | Age: 55
End: 2020-12-22
Payer: MEDICARE

## 2020-12-22 VITALS
RESPIRATION RATE: 17 BRPM | DIASTOLIC BLOOD PRESSURE: 85 MMHG | WEIGHT: 293 LBS | BODY MASS INDEX: 44.41 KG/M2 | OXYGEN SATURATION: 93 % | HEIGHT: 68 IN | SYSTOLIC BLOOD PRESSURE: 166 MMHG | TEMPERATURE: 98.5 F | HEART RATE: 67 BPM

## 2020-12-22 PROBLEM — R51.9 HEADACHE: Status: ACTIVE | Noted: 2020-12-22

## 2020-12-22 LAB
A/G RATIO: 1.3 (ref 1.1–2.2)
ALBUMIN SERPL-MCNC: 4.4 G/DL (ref 3.4–5)
ALP BLD-CCNC: 160 U/L (ref 40–129)
ALT SERPL-CCNC: 18 U/L (ref 10–40)
ANION GAP SERPL CALCULATED.3IONS-SCNC: 14 MMOL/L (ref 3–16)
AST SERPL-CCNC: 32 U/L (ref 15–37)
BACTERIA: ABNORMAL /HPF
BASOPHILS ABSOLUTE: 0.1 K/UL (ref 0–0.2)
BASOPHILS RELATIVE PERCENT: 1 %
BILIRUB SERPL-MCNC: 0.3 MG/DL (ref 0–1)
BILIRUBIN URINE: NEGATIVE
BLOOD, URINE: NEGATIVE
BUN BLDV-MCNC: 18 MG/DL (ref 7–20)
CALCIUM SERPL-MCNC: 9.3 MG/DL (ref 8.3–10.6)
CHLORIDE BLD-SCNC: 103 MMOL/L (ref 99–110)
CLARITY: CLEAR
CO2: 22 MMOL/L (ref 21–32)
COLOR: ABNORMAL
CREAT SERPL-MCNC: 1.1 MG/DL (ref 0.6–1.1)
D DIMER: <200 NG/ML DDU (ref 0–229)
EOSINOPHILS ABSOLUTE: 0.2 K/UL (ref 0–0.6)
EOSINOPHILS RELATIVE PERCENT: 2.6 %
EPITHELIAL CELLS, UA: ABNORMAL /HPF (ref 0–5)
GFR AFRICAN AMERICAN: >60
GFR NON-AFRICAN AMERICAN: 52
GLOBULIN: 3.4 G/DL
GLUCOSE BLD-MCNC: 115 MG/DL (ref 70–99)
GLUCOSE URINE: NEGATIVE MG/DL
HCT VFR BLD CALC: 43.6 % (ref 36–48)
HEMOGLOBIN: 14.5 G/DL (ref 12–16)
KETONES, URINE: NEGATIVE MG/DL
LACTIC ACID: 1.5 MMOL/L (ref 0.4–2)
LEUKOCYTE ESTERASE, URINE: ABNORMAL
LYMPHOCYTES ABSOLUTE: 1.7 K/UL (ref 1–5.1)
LYMPHOCYTES RELATIVE PERCENT: 19.6 %
MCH RBC QN AUTO: 27.7 PG (ref 26–34)
MCHC RBC AUTO-ENTMCNC: 33.2 G/DL (ref 31–36)
MCV RBC AUTO: 83.5 FL (ref 80–100)
MICROSCOPIC EXAMINATION: YES
MONOCYTES ABSOLUTE: 0.6 K/UL (ref 0–1.3)
MONOCYTES RELATIVE PERCENT: 7.5 %
NEUTROPHILS ABSOLUTE: 5.9 K/UL (ref 1.7–7.7)
NEUTROPHILS RELATIVE PERCENT: 69.3 %
NITRITE, URINE: NEGATIVE
PDW BLD-RTO: 15.3 % (ref 12.4–15.4)
PH UA: 6.5 (ref 5–8)
PLATELET # BLD: 259 K/UL (ref 135–450)
PMV BLD AUTO: 8.9 FL (ref 5–10.5)
POTASSIUM REFLEX MAGNESIUM: 3.8 MMOL/L (ref 3.5–5.1)
PRO-BNP: 194 PG/ML (ref 0–124)
PROTEIN UA: NEGATIVE MG/DL
RBC # BLD: 5.22 M/UL (ref 4–5.2)
RBC UA: ABNORMAL /HPF (ref 0–4)
SODIUM BLD-SCNC: 139 MMOL/L (ref 136–145)
SPECIFIC GRAVITY UA: 1.01 (ref 1–1.03)
TOTAL PROTEIN: 7.8 G/DL (ref 6.4–8.2)
TROPONIN: <0.01 NG/ML
URINE REFLEX TO CULTURE: ABNORMAL
URINE TYPE: ABNORMAL
UROBILINOGEN, URINE: 0.2 E.U./DL
WBC # BLD: 8.6 K/UL (ref 4–11)
WBC UA: ABNORMAL /HPF (ref 0–5)

## 2020-12-22 PROCEDURE — 85379 FIBRIN DEGRADATION QUANT: CPT

## 2020-12-22 PROCEDURE — 96375 TX/PRO/DX INJ NEW DRUG ADDON: CPT

## 2020-12-22 PROCEDURE — 2500000003 HC RX 250 WO HCPCS: Performed by: NURSE PRACTITIONER

## 2020-12-22 PROCEDURE — 6360000002 HC RX W HCPCS: Performed by: NURSE PRACTITIONER

## 2020-12-22 PROCEDURE — 80053 COMPREHEN METABOLIC PANEL: CPT

## 2020-12-22 PROCEDURE — 70450 CT HEAD/BRAIN W/O DYE: CPT

## 2020-12-22 PROCEDURE — 85025 COMPLETE CBC W/AUTO DIFF WBC: CPT

## 2020-12-22 PROCEDURE — 83605 ASSAY OF LACTIC ACID: CPT

## 2020-12-22 PROCEDURE — 71046 X-RAY EXAM CHEST 2 VIEWS: CPT

## 2020-12-22 PROCEDURE — 81001 URINALYSIS AUTO W/SCOPE: CPT

## 2020-12-22 PROCEDURE — 96374 THER/PROPH/DIAG INJ IV PUSH: CPT

## 2020-12-22 PROCEDURE — 99291 CRITICAL CARE FIRST HOUR: CPT

## 2020-12-22 PROCEDURE — 99283 EMERGENCY DEPT VISIT LOW MDM: CPT

## 2020-12-22 PROCEDURE — 2580000003 HC RX 258: Performed by: NURSE PRACTITIONER

## 2020-12-22 PROCEDURE — 93005 ELECTROCARDIOGRAM TRACING: CPT | Performed by: NURSE PRACTITIONER

## 2020-12-22 PROCEDURE — 84484 ASSAY OF TROPONIN QUANT: CPT

## 2020-12-22 PROCEDURE — 83880 ASSAY OF NATRIURETIC PEPTIDE: CPT

## 2020-12-22 RX ORDER — TOPIRAMATE 25 MG/1
TABLET ORAL
COMMUNITY
Start: 2020-11-03

## 2020-12-22 RX ORDER — 0.9 % SODIUM CHLORIDE 0.9 %
1000 INTRAVENOUS SOLUTION INTRAVENOUS ONCE
Status: COMPLETED | OUTPATIENT
Start: 2020-12-22 | End: 2020-12-22

## 2020-12-22 RX ORDER — LABETALOL HYDROCHLORIDE 5 MG/ML
10 INJECTION, SOLUTION INTRAVENOUS ONCE
Status: COMPLETED | OUTPATIENT
Start: 2020-12-22 | End: 2020-12-22

## 2020-12-22 RX ORDER — PRAVASTATIN SODIUM 40 MG
20 TABLET ORAL NIGHTLY
COMMUNITY
Start: 2020-12-21

## 2020-12-22 RX ORDER — DEXAMETHASONE SODIUM PHOSPHATE 10 MG/ML
10 INJECTION, SOLUTION INTRAMUSCULAR; INTRAVENOUS ONCE
Status: COMPLETED | OUTPATIENT
Start: 2020-12-22 | End: 2020-12-22

## 2020-12-22 RX ORDER — DIPHENHYDRAMINE HYDROCHLORIDE 50 MG/ML
12.5 INJECTION INTRAMUSCULAR; INTRAVENOUS ONCE
Status: COMPLETED | OUTPATIENT
Start: 2020-12-22 | End: 2020-12-22

## 2020-12-22 RX ORDER — METOCLOPRAMIDE HYDROCHLORIDE 5 MG/ML
10 INJECTION INTRAMUSCULAR; INTRAVENOUS ONCE
Status: COMPLETED | OUTPATIENT
Start: 2020-12-22 | End: 2020-12-22

## 2020-12-22 RX ADMIN — DIPHENHYDRAMINE HYDROCHLORIDE 12.5 MG: 50 INJECTION, SOLUTION INTRAMUSCULAR; INTRAVENOUS at 19:26

## 2020-12-22 RX ADMIN — SODIUM CHLORIDE 1000 ML: 9 INJECTION, SOLUTION INTRAVENOUS at 19:19

## 2020-12-22 RX ADMIN — METOCLOPRAMIDE 10 MG: 5 INJECTION, SOLUTION INTRAMUSCULAR; INTRAVENOUS at 19:24

## 2020-12-22 RX ADMIN — Medication 10 MG: at 23:17

## 2020-12-22 RX ADMIN — DEXAMETHASONE SODIUM PHOSPHATE 10 MG: 10 INJECTION, SOLUTION INTRAMUSCULAR; INTRAVENOUS at 19:23

## 2020-12-22 ASSESSMENT — ENCOUNTER SYMPTOMS
RHINORRHEA: 0
SORE THROAT: 0
COLOR CHANGE: 0
SHORTNESS OF BREATH: 0
ABDOMINAL PAIN: 0

## 2020-12-22 ASSESSMENT — PAIN DESCRIPTION - LOCATION: LOCATION: HEAD

## 2020-12-22 ASSESSMENT — PAIN DESCRIPTION - PAIN TYPE: TYPE: ACUTE PAIN

## 2020-12-22 ASSESSMENT — PAIN DESCRIPTION - DESCRIPTORS: DESCRIPTORS: ACHING

## 2020-12-22 NOTE — ED TRIAGE NOTES
Chief Complaint   Patient presents with    Hypertension     pt c/o htn, states took extra medication but it was still elevated, highest 183/115, also c/o headache       Preferred [facility]acy: [facility]  Primary Care Provider: Amarilis Dietrich  : [facility]  Phone Number: [phone]  Relation: Son

## 2020-12-22 NOTE — ED PROVIDER NOTES
[address] ED  EMERGENCY DEPARTMENT ENCOUNTER        Pt Name: Hina Hung  MRN: [id number]  [place] 1965  Date of evaluation: 12/22/2020  Provider: DON Mustafa CNP  PCP: DON Rouse CNP  ED Attending: No att. providers found    [facility]       Chief Complaint   Patient presents with    Hypertension     pt c/o htn, states took extra medication but it was still elevated, highest 183/115, also c/o headache       HISTORY OF PRESENT ILLNESS   (Location/Symptom, Timing/Onset, Context/Setting, Quality, Duration, Modifying Factors, Severity)  Note limiting factors. Hina Hung is a 48 y.o. female for elevated blood pressure. Onset was 4 days. Context includes pt states she has had an elevated blood pressure for the past 4 days. Pt states she talked to her pcp who recommended increasing her doses. Patient reports that she has doubled her blood pressure medication and has taken an extra dose of clonidine today. Patient states that she also has a headache. Patient reports that she does not have a history of headaches. Patient denies any fever nausea vomiting or diarrhea. She does intermittently complain of some shortness of breath. Alleviating factors include nothing. Aggravating factors include nothing. Pain is 7/10. nothing has been used for pain today. Nursing Notes were all reviewed and agreed with or any disagreements were addressed  in the HPI. REVIEW OF SYSTEMS  (2-9 systems for level 4, 10 or more for level 5)     Review of Systems   Constitutional: Negative for fever. Elevated blood pressure   HENT: Negative for congestion, rhinorrhea and sore throat. Respiratory: Negative for shortness of breath. Cardiovascular: Negative for chest pain. Gastrointestinal: Negative for abdominal pain. Genitourinary: Negative for decreased urine volume and difficulty urinating. Musculoskeletal: Negative for arthralgias and myalgias.    Skin: Negative for color change and rash. Neurological: Positive for headaches. Negative for dizziness and light-headedness. Psychiatric/Behavioral: Negative for agitation. All other systems reviewed and are negative. Positivesand Pertinent negatives as per HPI. Except as noted above in the ROS, all other systems were reviewed and negative. PAST MEDICAL HISTORY     Past Medical History:   Diagnosis Date    Anxiety     Arthritis     Asthma     Fibromyalgia     Hypertension     Migraine     Primary osteoarthritis of both knees     Sleep apnea     uses CPAP    Thyroid disease          SURGICAL HISTORY       Past Surgical History:   Procedure Laterality Date    CHOLECYSTECTOMY  10/14/2014    Open cholecystectomy and hepatico jejunostomy, estela-en-y    COLONOSCOPY  06/01/2017    polyp    ENDOMETRIAL ABLATION  02/2013    KNEE ARTHROSCOPY Left 2/23/16    lateral menisectomoy chondroplasty synovectomy    LAP BAND      THYROIDECTOMY, PARTIAL      UPPER GASTROINTESTINAL ENDOSCOPY N/A 9/13/2019    EGD BIOPSY performed by Tahira Winter MD at [facility]       Previous Medications    ACETAMINOPHEN (TYLENOL) 500 MG TABLET    Take 1,000 mg by mouth daily    ALBUTEROL (PROVENTIL HFA;VENTOLIN HFA) 108 (90 BASE) MCG/ACT INHALER    Inhale 2 puffs into the lungs every 6 hours as needed. ALPRAZOLAM (XANAX) 0.25 MG TABLET    Take 0.5 mg by mouth nightly as needed. BUSPIRONE (BUSPAR) 15 MG TABLET    Take 15 mg by mouth 2 times daily    CLONIDINE (CATAPRES) 0.1 MG TABLET    Take 0.1 mg by mouth 2 times daily    CYCLOBENZAPRINE (FLEXERIL) 10 MG TABLET    Take 5 mg by mouth nightly as needed. FEXOFENADINE HCL (ALLEGRA PO)    Take 60 mg by mouth daily as needed     FLUOXETINE HCL (PROZAC PO)    Take 80 mg by mouth daily. FLUTICASONE (FLONASE) 50 MCG/ACT NASAL SPRAY    1 spray by Nasal route daily.       LEVOTHYROXINE SODIUM (LEVOXYL PO)    Take 50 mcg by mouth None     Minutes per session: None    Stress: None   Relationships    Social connections     Talks on phone: None     Gets together: None     Attends [employer] service: None     Active member of club or organization: None     Attends meetings of clubs or organizations: None     Relationship status: None    Intimate partner violence     Fear of current or ex partner: None     Emotionally abused: None     Physically abused: None     Forced sexual activity: None   Other Topics Concern    None   Social History Narrative    None       SCREENINGS   NIH Stroke Scale  Interval: Baseline  Level of Consciousness (1a. ): Alert  LOC Questions (1b. ): Answers both correctly  LOC Commands (1c. ): Performs both tasks correctly  Best Gaze (2. ): Normal  Visual (3. ): No visual loss  Facial Palsy (4. ): Normal symmetrical movement  Motor Arm, Left (5a. ): No drift  Motor Arm, Right (5b. ): No drift  Motor Leg, Left (6a. ): No drift  Motor Leg, Right (6b. ): No drift  Limb Ataxia (7. ): Absent  Sensory (8. ): Normal  Best Language (9. ): No aphasia  Dysarthria (10. ): Normal  Extinction and Inattention (11): No abnormality  Total: 0         PHYSICAL EXAM    (up to 7 for level 4, 8 ormore for level 5)     ED Triage Vitals   BP Temp Temp Source Pulse Resp SpO2 Height Weight   12/22/20 1745 12/22/20 1742 12/22/20 1742 12/22/20 1742 12/22/20 1742 12/22/20 1742 12/22/20 1742 12/22/20 1742   (!) 184/94 98.5 °F (36.9 °C) Oral 73 16 98 % 5' 8\" (1.727 m) (!) 305 lb (138.3 kg)       Physical Exam  Constitutional:       Appearance: She is well-developed. She is obese. HENT:      Head: Normocephalic and atraumatic. Eyes:      Extraocular Movements: Extraocular movements intact. Pupils: Pupils are equal, round, and reactive to light. Neck:      Musculoskeletal: Normal range of motion. Cardiovascular:      Rate and Rhythm: Normal rate. Pulmonary:      Effort: Pulmonary effort is normal. No respiratory distress.    Abdominal: General: There is no distension. Palpations: Abdomen is soft. Tenderness: There is no abdominal tenderness. Musculoskeletal: Normal range of motion. Skin:     General: Skin is warm and dry. Neurological:      General: No focal deficit present. Mental Status: She is alert and oriented to person, place, and time.          DIAGNOSTIC RESULTS   LABS:    Labs Reviewed   CBC WITH AUTO DIFFERENTIAL - Abnormal; Notable for the following components:       Result Value    RBC 5.22 (*)     All other components within normal limits    Narrative:     Performed at:  [facility] [address],  [place]   Phone [phone]   COMPREHENSIVE METABOLIC PANEL W/ REFLEX TO MG FOR LOW K - Abnormal; Notable for the following components:    Glucose 115 (*)     GFR Non-African American 52 (*)     Alkaline Phosphatase 160 (*)     All other components within normal limits    Narrative:     Performed at:  [place] [address],  [place]   Phone [phone]   URINE RT REFLEX TO CULTURE - Abnormal; Notable for the following components:    Leukocyte Esterase, Urine SMALL (*)     All other components within normal limits    Narrative:     Performed at:  [facility]) [facility] [address],  [place]   Phone [phone]   BRAIN NATRIURETIC PEPTIDE - Abnormal; Notable for the following components:    Pro- (*)     All other components within normal limits    Narrative:     Performed at:  [facility] [address],  [place]   Phone [phone]   MICROSCOPIC URINALYSIS - Abnormal; Notable for the following components:    Bacteria, UA Rare (*)     All other components within normal limits    Narrative:     Performed at:  [place] [address],  [place]   Phone [phone] and have an MRI. Consult was placed to the [facility] hospitalist who is accepted the patient but was requesting a consult with neurology. I did consult with neurology who recommended the patient go to a regular floor that was able to do every 3 hour neurovascular checks. They recommended normalizing the blood pressure with what ever medications the hospitalist prefer to use. Patient was provided with IV fluids and medications for headache in the ED. Patient had an NIH of 0. At this point the patient will be transferred to [facility] for further care. CRITICAL CARE NOTE:  There was a high probability of clinically significant life-threatening deterioration of the patient's condition requiring my urgent intervention. Total critical care time is 45 minutes. This includes vital sign monitoring, pulse oximetry monitoring, telemetry monitoring, clinical response to the IV medications, reviewing the nursing notes, consultation time, dictation/documentation time, and interpretation of the labwork. The patient tolerated their visit well. They were seen and evaluated by the attending physician, No att. providers found who agreed with the assessment and plan. The patient and / or the family were informed of the results of any tests, a time was given to answer questions, a plan was proposed and they agreed with plan. FINAL IMPRESSION      1. Posterior reversible encephalopathy syndrome (PRES)    2. Nonintractable headache, unspecified chronicity pattern, unspecified headache type    3. Hypertension, unspecified type          DISPOSITION/PLAN   DISPOSITION Decision To Transfer 12/22/2020 07:15:26 PM      PATIENT REFERRED TO:  No follow-up provider specified.     DISCHARGE MEDICATIONS:  New Prescriptions    No medications on file       DISCONTINUED MEDICATIONS:  Discontinued Medications    No medications on file              (Please note that portions of this note were completed with a voice recognition program.  Efforts were made to edit the dictations but occasionally words are mis-transcribed.)    DON Celestin CNP (electronically signed)       DON Celestin CNP  12/22/20 2121

## 2020-12-23 ENCOUNTER — HOSPITAL ENCOUNTER (INPATIENT)
Age: 55
LOS: 1 days | Discharge: HOME | DRG: 103 | End: 2020-12-23
Attending: INTERNAL MEDICINE | Admitting: INTERNAL MEDICINE
Payer: MEDICARE

## 2020-12-23 ENCOUNTER — APPOINTMENT (OUTPATIENT)
Dept: MRI IMAGING | Age: 55
DRG: 103 | End: 2020-12-23
Attending: INTERNAL MEDICINE
Payer: MEDICARE

## 2020-12-23 VITALS
WEIGHT: 293 LBS | SYSTOLIC BLOOD PRESSURE: 148 MMHG | RESPIRATION RATE: 16 BRPM | TEMPERATURE: 98.2 F | BODY MASS INDEX: 44.41 KG/M2 | DIASTOLIC BLOOD PRESSURE: 96 MMHG | HEIGHT: 68 IN | OXYGEN SATURATION: 97 % | HEART RATE: 72 BPM

## 2020-12-23 LAB
ANION GAP SERPL CALCULATED.3IONS-SCNC: 13 MMOL/L (ref 3–16)
BUN BLDV-MCNC: 18 MG/DL (ref 7–20)
CALCIUM SERPL-MCNC: 9.6 MG/DL (ref 8.3–10.6)
CHLORIDE BLD-SCNC: 105 MMOL/L (ref 99–110)
CO2: 22 MMOL/L (ref 21–32)
CREAT SERPL-MCNC: 1 MG/DL (ref 0.6–1.1)
EKG ATRIAL RATE: 59 BPM
EKG DIAGNOSIS: NORMAL
EKG P AXIS: 27 DEGREES
EKG P-R INTERVAL: 182 MS
EKG Q-T INTERVAL: 446 MS
EKG QRS DURATION: 86 MS
EKG QTC CALCULATION (BAZETT): 441 MS
EKG R AXIS: 30 DEGREES
EKG T AXIS: -3 DEGREES
EKG VENTRICULAR RATE: 59 BPM
GAMMA GLUTAMYL TRANSFERASE: 48 U/L (ref 5–36)
GFR AFRICAN AMERICAN: >60
GFR NON-AFRICAN AMERICAN: 58
GLUCOSE BLD-MCNC: 159 MG/DL (ref 70–99)
POTASSIUM REFLEX MAGNESIUM: 4 MMOL/L (ref 3.5–5.1)
SODIUM BLD-SCNC: 140 MMOL/L (ref 136–145)

## 2020-12-23 PROCEDURE — G0379 DIRECT REFER HOSPITAL OBSERV: HCPCS

## 2020-12-23 PROCEDURE — 6370000000 HC RX 637 (ALT 250 FOR IP): Performed by: STUDENT IN AN ORGANIZED HEALTH CARE EDUCATION/TRAINING PROGRAM

## 2020-12-23 PROCEDURE — 2580000003 HC RX 258: Performed by: STUDENT IN AN ORGANIZED HEALTH CARE EDUCATION/TRAINING PROGRAM

## 2020-12-23 PROCEDURE — 96374 THER/PROPH/DIAG INJ IV PUSH: CPT

## 2020-12-23 PROCEDURE — 96372 THER/PROPH/DIAG INJ SC/IM: CPT

## 2020-12-23 PROCEDURE — 80048 BASIC METABOLIC PNL TOTAL CA: CPT

## 2020-12-23 PROCEDURE — 82977 ASSAY OF GGT: CPT

## 2020-12-23 PROCEDURE — 2500000003 HC RX 250 WO HCPCS: Performed by: STUDENT IN AN ORGANIZED HEALTH CARE EDUCATION/TRAINING PROGRAM

## 2020-12-23 PROCEDURE — 1200000000 HC SEMI PRIVATE

## 2020-12-23 PROCEDURE — G0378 HOSPITAL OBSERVATION PER HR: HCPCS

## 2020-12-23 PROCEDURE — 93010 ELECTROCARDIOGRAM REPORT: CPT | Performed by: INTERNAL MEDICINE

## 2020-12-23 PROCEDURE — 70551 MRI BRAIN STEM W/O DYE: CPT

## 2020-12-23 PROCEDURE — 6360000002 HC RX W HCPCS: Performed by: STUDENT IN AN ORGANIZED HEALTH CARE EDUCATION/TRAINING PROGRAM

## 2020-12-23 PROCEDURE — 36415 COLL VENOUS BLD VENIPUNCTURE: CPT

## 2020-12-23 RX ORDER — PRAVASTATIN SODIUM 20 MG
20 TABLET ORAL NIGHTLY
Status: DISCONTINUED | OUTPATIENT
Start: 2020-12-23 | End: 2020-12-23 | Stop reason: HOSPADM

## 2020-12-23 RX ORDER — VALSARTAN 160 MG/1
160 TABLET ORAL DAILY
COMMUNITY

## 2020-12-23 RX ORDER — ACETAMINOPHEN 650 MG/1
650 SUPPOSITORY RECTAL EVERY 6 HOURS PRN
Status: DISCONTINUED | OUTPATIENT
Start: 2020-12-23 | End: 2020-12-23 | Stop reason: HOSPADM

## 2020-12-23 RX ORDER — SODIUM CHLORIDE 0.9 % (FLUSH) 0.9 %
10 SYRINGE (ML) INJECTION PRN
Status: DISCONTINUED | OUTPATIENT
Start: 2020-12-23 | End: 2020-12-23 | Stop reason: HOSPADM

## 2020-12-23 RX ORDER — ALPRAZOLAM 0.5 MG/1
0.5 TABLET ORAL DAILY
Status: DISCONTINUED | OUTPATIENT
Start: 2020-12-23 | End: 2020-12-23 | Stop reason: HOSPADM

## 2020-12-23 RX ORDER — PROMETHAZINE HYDROCHLORIDE 12.5 MG/1
12.5 TABLET ORAL EVERY 6 HOURS PRN
Status: DISCONTINUED | OUTPATIENT
Start: 2020-12-23 | End: 2020-12-23 | Stop reason: HOSPADM

## 2020-12-23 RX ORDER — TOPIRAMATE 25 MG/1
25 TABLET ORAL 2 TIMES DAILY
Status: DISCONTINUED | OUTPATIENT
Start: 2020-12-23 | End: 2020-12-23 | Stop reason: HOSPADM

## 2020-12-23 RX ORDER — ACETAMINOPHEN 325 MG/1
650 TABLET ORAL EVERY 6 HOURS PRN
Status: DISCONTINUED | OUTPATIENT
Start: 2020-12-23 | End: 2020-12-23 | Stop reason: HOSPADM

## 2020-12-23 RX ORDER — BUSPIRONE HYDROCHLORIDE 15 MG/1
7.5 TABLET ORAL DAILY
COMMUNITY

## 2020-12-23 RX ORDER — BUSPIRONE HYDROCHLORIDE 5 MG/1
7.5 TABLET ORAL DAILY
Status: DISCONTINUED | OUTPATIENT
Start: 2020-12-23 | End: 2020-12-23 | Stop reason: HOSPADM

## 2020-12-23 RX ORDER — VALSARTAN 80 MG/1
160 TABLET ORAL DAILY
Status: DISCONTINUED | OUTPATIENT
Start: 2020-12-23 | End: 2020-12-23 | Stop reason: HOSPADM

## 2020-12-23 RX ORDER — OXYCODONE HYDROCHLORIDE AND ACETAMINOPHEN 5; 325 MG/1; MG/1
1 TABLET ORAL EVERY 4 HOURS PRN
Status: DISCONTINUED | OUTPATIENT
Start: 2020-12-23 | End: 2020-12-23 | Stop reason: HOSPADM

## 2020-12-23 RX ORDER — POLYETHYLENE GLYCOL 3350 17 G/17G
17 POWDER, FOR SOLUTION ORAL DAILY PRN
Status: DISCONTINUED | OUTPATIENT
Start: 2020-12-23 | End: 2020-12-23 | Stop reason: HOSPADM

## 2020-12-23 RX ORDER — FLUOXETINE HYDROCHLORIDE 20 MG/1
80 CAPSULE ORAL DAILY
Status: DISCONTINUED | OUTPATIENT
Start: 2020-12-23 | End: 2020-12-23 | Stop reason: HOSPADM

## 2020-12-23 RX ORDER — MONTELUKAST SODIUM 10 MG/1
10 TABLET ORAL NIGHTLY
Status: DISCONTINUED | OUTPATIENT
Start: 2020-12-23 | End: 2020-12-23 | Stop reason: HOSPADM

## 2020-12-23 RX ORDER — LEVOTHYROXINE SODIUM 0.05 MG/1
50 TABLET ORAL DAILY
Status: DISCONTINUED | OUTPATIENT
Start: 2020-12-23 | End: 2020-12-23 | Stop reason: HOSPADM

## 2020-12-23 RX ORDER — ALLOPURINOL 100 MG/1
100 TABLET ORAL DAILY
Status: DISCONTINUED | OUTPATIENT
Start: 2020-12-23 | End: 2020-12-23 | Stop reason: HOSPADM

## 2020-12-23 RX ORDER — ONDANSETRON 2 MG/ML
4 INJECTION INTRAMUSCULAR; INTRAVENOUS EVERY 6 HOURS PRN
Status: DISCONTINUED | OUTPATIENT
Start: 2020-12-23 | End: 2020-12-23 | Stop reason: HOSPADM

## 2020-12-23 RX ORDER — CYCLOBENZAPRINE HCL 10 MG
10 TABLET ORAL NIGHTLY PRN
Status: DISCONTINUED | OUTPATIENT
Start: 2020-12-23 | End: 2020-12-23 | Stop reason: HOSPADM

## 2020-12-23 RX ORDER — LABETALOL HYDROCHLORIDE 5 MG/ML
10 INJECTION, SOLUTION INTRAVENOUS EVERY 4 HOURS PRN
Status: DISCONTINUED | OUTPATIENT
Start: 2020-12-23 | End: 2020-12-23 | Stop reason: HOSPADM

## 2020-12-23 RX ORDER — ALLOPURINOL 100 MG/1
100 TABLET ORAL DAILY
COMMUNITY

## 2020-12-23 RX ORDER — SODIUM CHLORIDE 0.9 % (FLUSH) 0.9 %
10 SYRINGE (ML) INJECTION EVERY 12 HOURS SCHEDULED
Status: DISCONTINUED | OUTPATIENT
Start: 2020-12-23 | End: 2020-12-23 | Stop reason: HOSPADM

## 2020-12-23 RX ORDER — BUTALBITAL, ACETAMINOPHEN AND CAFFEINE 50; 325; 40 MG/1; MG/1; MG/1
1 TABLET ORAL EVERY 4 HOURS PRN
Status: DISCONTINUED | OUTPATIENT
Start: 2020-12-23 | End: 2020-12-23 | Stop reason: HOSPADM

## 2020-12-23 RX ORDER — FLUTICASONE PROPIONATE 50 MCG
1 SPRAY, SUSPENSION (ML) NASAL DAILY
Status: DISCONTINUED | OUTPATIENT
Start: 2020-12-23 | End: 2020-12-23 | Stop reason: HOSPADM

## 2020-12-23 RX ORDER — CLONIDINE HYDROCHLORIDE 0.1 MG/1
0.1 TABLET ORAL 2 TIMES DAILY
Status: DISCONTINUED | OUTPATIENT
Start: 2020-12-23 | End: 2020-12-23 | Stop reason: HOSPADM

## 2020-12-23 RX ADMIN — Medication 10 ML: at 08:37

## 2020-12-23 RX ADMIN — FLUOXETINE 80 MG: 20 CAPSULE ORAL at 08:36

## 2020-12-23 RX ADMIN — ALLOPURINOL 100 MG: 100 TABLET ORAL at 08:36

## 2020-12-23 RX ADMIN — VALSARTAN 160 MG: 80 TABLET, FILM COATED ORAL at 08:37

## 2020-12-23 RX ADMIN — LEVOTHYROXINE SODIUM 50 MCG: 50 TABLET ORAL at 06:20

## 2020-12-23 RX ADMIN — ENOXAPARIN SODIUM 40 MG: 40 INJECTION SUBCUTANEOUS at 08:37

## 2020-12-23 RX ADMIN — TOPIRAMATE 25 MG: 25 TABLET, FILM COATED ORAL at 08:37

## 2020-12-23 RX ADMIN — CLONIDINE HYDROCHLORIDE 0.1 MG: 0.1 TABLET ORAL at 08:36

## 2020-12-23 RX ADMIN — ACETAMINOPHEN 650 MG: 325 TABLET ORAL at 08:46

## 2020-12-23 RX ADMIN — ACETAMINOPHEN 650 MG: 325 TABLET ORAL at 02:09

## 2020-12-23 RX ADMIN — FLUTICASONE PROPIONATE 1 SPRAY: 50 SPRAY, METERED NASAL at 08:37

## 2020-12-23 RX ADMIN — TOPIRAMATE 25 MG: 25 TABLET, FILM COATED ORAL at 02:09

## 2020-12-23 RX ADMIN — LABETALOL HYDROCHLORIDE 10 MG: 5 INJECTION, SOLUTION INTRAVENOUS at 07:10

## 2020-12-23 RX ADMIN — BUTALBITAL, ACETAMINOPHEN, AND CAFFEINE 1 TABLET: 50; 325; 40 TABLET ORAL at 12:21

## 2020-12-23 RX ADMIN — BUSPIRONE HYDROCHLORIDE 15 MG: 10 TABLET ORAL at 02:09

## 2020-12-23 RX ADMIN — BUSPIRONE HYDROCHLORIDE 7.5 MG: 5 TABLET ORAL at 08:36

## 2020-12-23 RX ADMIN — ALPRAZOLAM 0.5 MG: 0.5 TABLET ORAL at 08:36

## 2020-12-23 ASSESSMENT — PAIN SCALES - GENERAL
PAINLEVEL_OUTOF10: 7
PAINLEVEL_OUTOF10: 3
PAINLEVEL_OUTOF10: 7
PAINLEVEL_OUTOF10: 0
PAINLEVEL_OUTOF10: 7

## 2020-12-23 ASSESSMENT — PAIN DESCRIPTION - PAIN TYPE
TYPE: ACUTE PAIN

## 2020-12-23 ASSESSMENT — PAIN - FUNCTIONAL ASSESSMENT
PAIN_FUNCTIONAL_ASSESSMENT: PREVENTS OR INTERFERES SOME ACTIVE ACTIVITIES AND ADLS
PAIN_FUNCTIONAL_ASSESSMENT: PREVENTS OR INTERFERES SOME ACTIVE ACTIVITIES AND ADLS
PAIN_FUNCTIONAL_ASSESSMENT: ACTIVITIES ARE NOT PREVENTED

## 2020-12-23 ASSESSMENT — PAIN DESCRIPTION - LOCATION
LOCATION: HEAD

## 2020-12-23 ASSESSMENT — PAIN DESCRIPTION - PROGRESSION
CLINICAL_PROGRESSION: GRADUALLY WORSENING

## 2020-12-23 ASSESSMENT — PAIN DESCRIPTION - ONSET
ONSET: ON-GOING

## 2020-12-23 ASSESSMENT — PAIN DESCRIPTION - DESCRIPTORS
DESCRIPTORS: HEADACHE

## 2020-12-23 ASSESSMENT — PAIN DESCRIPTION - FREQUENCY
FREQUENCY: CONTINUOUS

## 2020-12-23 ASSESSMENT — PAIN DESCRIPTION - ORIENTATION
ORIENTATION: ANTERIOR
ORIENTATION: ANTERIOR
ORIENTATION: POSTERIOR;ANTERIOR

## 2020-12-23 NOTE — ED NOTES
2306 Bed Assignment           Bed# 5501-01           Report# [id number]       [employer]  12/22/20 4640

## 2020-12-23 NOTE — ED NOTES
1942 Placed call to transfer center for consult with [facility] hospitalist per Ravinder Negro  2015 Dr. Figueredo Call hospitalist, returned call to [facility]  Neurology was page by transfer center  2047 Dr. Ravinder Negro with neurology returned call to [facility]  12/22/20 1946       Nyasia Mera  12/22/20 2018       Nyasia Mera  12/22/20 2047

## 2020-12-23 NOTE — PROGRESS NOTES
Stroke Admission    I agree as the admission nurse that I have completed a thorough stroke assessment and completed the admission on the patient. ALL assessment areas listed below have been addressed and completed. Presentation: TIA    Handoff assessment completed with JACOBO blank. Current NIHSS 0. [x]   Education Assessment  [x]   Individualized Stroke/TIA Education template added, including patient specific risk factors: Hypertension  [x]   Individualized Stroke/TIA Care Plan template added  [x]   Swallow screen completed using the [employer] Protocol, and documented on flowsheet PRIOR to oral intake: Pass  [x]   VTE Prophylaxis: SCDs ordered/addressed; SCDs: Refused           (If Medication, choose N/A and write Medication name.  ASA, Plavix and TPA are not VTE prophylaxis. )  [x]   Stroke education booklet given, and education initiated with patient and/or caregiver      Nurse eSignature: Electronically signed by Kristine Hsu RN on 12/23/20 at 3:16 AM EST

## 2020-12-23 NOTE — PLAN OF CARE
Problem: HEMODYNAMIC STATUS  Goal: Patient has stable vital signs and fluid balance  Outcome: Ongoing     Problem: ACTIVITY INTOLERANCE/IMPAIRED MOBILITY  Goal: Mobility/activity is maintained at optimum level for patient  Outcome: Ongoing     Problem: COMMUNICATION IMPAIRMENT  Goal: Ability to express needs and understand communication  Outcome: Ongoing

## 2020-12-23 NOTE — ED NOTES
Pt report called to Emeterio Kothari, at [phone]. [phone]. Pt transported via [facility].      Ayde Sullivan RN  12/23/20 9563

## 2020-12-23 NOTE — CARE COORDINATION
of the hospital at discharge, or pharmacy can deliver to the bedside if staff is available. (payment due at time of pick-up or delivery - cash, check, or card accepted)     Able to afford home medications/ co-pay costs: Yes    ADLS  Support Systems: Spouse/Significant Other, Children    PT AM-PAC:   /24  OT AM-PAC:   /24    [place]: home with son  Steps:      Plans to RETURN to current housing: Yes  Barriers to RETURNING to current housing: medical stability    Home Care Information  Currently ACTIVE with [address]: No  Home Care Agency: Not Applicable    Currently ACTIVE with [facility]: No  Passport/ Waiver: No  Passport/ Waiver Services: Not Applicable           Durable Medical Equipment  DME Provider:    Equipment: none    Home Oxygen and [address] [place] prior to admission: No  [address]: Not Applicable  Other Respiratory Equipment:          Dialysis  Active with HD/PD prior to admission: No  Nephrologist:      HD Center:  Not Applicable    DISCHARGE PLAN:  Disposition: Home- No Services Needed    Transportation PLAN for discharge: family     Factors facilitating achievement of predicted outcomes: Family support, Motivated, Cooperative and Pleasant    Barriers to discharge: medical stability/MRI today    Additional Case Management Notes: Cm met with pt. Does not anticipate any needs at dc. If pt discharges tomorrow she would like any scripts sent to [facility] due to pharmacies closing early. The Plan for Transition of Care is related to the following treatment goals of Headache [R51.9]    The Patient and/or patient representative Tati Cheema and her family were provided with a choice of provider and agrees with the discharge plan Not Indicated    Freedom of choice list was provided with basic dialogue that supports the patient's individualized plan of care/goals and shares the quality data associated with the providers.  Not Indicated    Care Transition patient: Maegan Jessica RN  [facility]  Case Management Department  Ph: [phone]

## 2020-12-23 NOTE — PROGRESS NOTES
Patient is alert and oriented, up ad sacha. Tolerating diet well, denies nausea/vomiting. VSS, BP has improved. Voiding adequately.

## 2020-12-23 NOTE — H&P
Internal Medicine  PGY 2  History & Physical      CC Headache     History Obtained From:  patient    HISTORY OF PRESENT ILLNESS:  Dede Worrell is a 60-year-old female with history of anxiety, asthma, fibromyalgia, hypertension, migraine, sleep apnea, hypothyroidism being transferred from an outside hospital.  Patient stated that she had started to have a headache for the last several days. She recently went to her PCP noted that her blood pressure was elevated over normal.  They increased her dose of valsartan on 18/20. The patient has been taking her blood pressure at home and stated highest blood pressures were in the 180s. Normally her blood pressure is around the 120s while on her medications. She stated that she was feeling slightly dizzy with frontal headache. CT head was done that was concerning for PRES. the patient was also taking Catapres that was started several months ago as the patient was taken off of her Triamterene/HCTZ due to poor renal function, and stopped on ACE due to angioedema. BP on admission 160s  She was transferred here for neuro neurological evaluation, and MRI.     Past Medical History:        Diagnosis Date    Anxiety     Arthritis     Asthma     Fibromyalgia     Hypertension     Migraine     Primary osteoarthritis of both knees     Sleep apnea     uses CPAP    Thyroid disease    ·     Past Surgical History:        Procedure Laterality Date    CHOLECYSTECTOMY  10/14/2014    Open cholecystectomy and hepatico jejunostomy, estela-en-y    COLONOSCOPY  06/01/2017    polyp    ENDOMETRIAL ABLATION  02/2013    KNEE ARTHROSCOPY Left 2/23/16    lateral menisectomoy chondroplasty synovectomy    LAP BAND      THYROIDECTOMY, PARTIAL      UPPER GASTROINTESTINAL ENDOSCOPY N/A 9/13/2019    EGD BIOPSY performed by Camila Figueroa MD at [address]   ·     Medications Priorto Admission:    · Medications Prior to Admission: valsartan (DIOVAN) 160 MG tablet, Take 160 mg by mouth daily  · allopurinol (ZYLOPRIM) 100 MG tablet, Take 100 mg by mouth daily  · pravastatin (PRAVACHOL) 40 MG tablet, 20 mg nightly   · topiramate (TOPAMAX) 25 MG tablet, TAKE TWO TABLETS BY MOUTH EVERY MORNING AND TAKE ONE TABLET BY MOUTH IN THE EVENING AS DIRECTED  · busPIRone (BUSPAR) 15 MG tablet, Take 15 mg by mouth nightly   · acetaminophen (TYLENOL) 500 MG tablet, Take 1,000 mg by mouth daily  · cloNIDine (CATAPRES) 0.1 MG tablet, Take 0.1 mg by mouth 2 times daily  · Multiple Vitamins-Minerals (THERAPEUTIC MULTIVITAMIN-MINERALS) tablet, Take 1 tablet by mouth daily  · Vitamin D (CHOLECALCIFEROL) 1000 UNITS CAPS capsule, Take 1,000 Units by mouth daily. · montelukast (SINGULAIR) 10 MG tablet, Take 10 mg by mouth nightly. · Fexofenadine HCl (ALLEGRA PO), Take 60 mg by mouth daily as needed   · ALPRAZolam (XANAX) 0.25 MG tablet, Take 0.5 mg by mouth daily. · cyclobenzaprine (FLEXERIL) 10 MG tablet, Take 5 mg by mouth nightly as needed. · fluticasone (FLONASE) 50 MCG/ACT nasal spray, 1 spray by Nasal route daily. · vitamin B-12 (CYANOCOBALAMIN) 100 MCG tablet, Take 50 mcg by mouth daily. · FLUoxetine HCl (PROZAC PO), Take 80 mg by mouth daily. · Levothyroxine Sodium (LEVOXYL PO), Take 50 mcg by mouth daily. Unknown dose    Allergies:  Ace inhibitors, Amlodipine, Lisinopril, Norvasc [amlodipine besylate], and Verapamil    Social History:   · TOBACCO:   reports that she has never smoked. She has never used smokeless tobacco.  · ETOH:   reports current alcohol use of about 1.0 standard drinks of alcohol per week. · DRUGS : none  · Patient currently lives with family   ·   Family History:       Problem Relation Age of Onset    Heart Disease Mother     Cancer Mother     Heart Disease Maternal Grandfather     Heart Disease Father     Stroke Father    ·     [unfilled]    ROS: A 10 point review of systems was conducted, significant findings as noted in HPI.       Physical Exam  Constitutional: General: She is not in acute distress. Appearance: Normal appearance. She is obese. She is not ill-appearing, toxic-appearing or diaphoretic. HENT:      Head: Normocephalic and atraumatic. Eyes:      General: No scleral icterus. Right eye: No discharge. Left eye: No discharge. Extraocular Movements: Extraocular movements intact. Conjunctiva/sclera: Conjunctivae normal.      Pupils: Pupils are equal, round, and reactive to light. Neck:      Musculoskeletal: Normal range of motion and neck supple. No neck rigidity. Cardiovascular:      Rate and Rhythm: Normal rate and regular rhythm. Pulses: Normal pulses. Heart sounds: Normal heart sounds. No murmur. Pulmonary:      Effort: Pulmonary effort is normal. No respiratory distress. Breath sounds: Normal breath sounds. No stridor. No wheezing. Abdominal:      General: Abdomen is flat. Bowel sounds are normal. There is no distension. Palpations: Abdomen is soft. Tenderness: There is no abdominal tenderness. Musculoskeletal: Normal range of motion. Right lower leg: No edema. Left lower leg: No edema. Skin:     General: Skin is warm and dry. Coloration: Skin is not jaundiced. Neurological:      General: No focal deficit present. Mental Status: She is alert and oriented to person, place, and time. Mental status is at baseline. Cranial Nerves: No cranial nerve deficit. Sensory: No sensory deficit. Motor: No weakness. Coordination: Coordination normal.      Gait: Gait normal.   Psychiatric:         Mood and Affect: Mood normal.         Thought Content:  Thought content normal.         Judgment: Judgment normal.       Physical exam:       Vitals:    12/23/20 0115   BP: (!) 165/95   Pulse: 80   Resp: 16   Temp: 98 °F (36.7 °C)   SpO2: 96%       DATA:    Labs:  CBC:   Recent Labs     12/22/20  1902   WBC 8.6   HGB 14.5   HCT 43.6          BMP:   Recent Labs 12/22/20 1902      K 3.8      CO2 22   BUN 18   CREATININE 1.1   GLUCOSE 115*     LFT's:   Recent Labs     12/22/20 1902   AST 32   ALT 18   BILITOT 0.3   ALKPHOS 160*     Troponin:   Recent Labs     12/22/20 1902   TROPONINI <0.01     BNP:No results for input(s): BNP in the last 72 hours. ABGs: No results for input(s): PHART, WFM1HZA, PO2ART in the last 72 hours. INR: No results for input(s): INR in the last 72 hours. U/A:  Recent Labs     12/22/20 1902   COLORU Straw   PHUR 6.5   WBCUA 3-5   RBCUA 0-2   BACTERIA Rare*   CLARITYU Clear   SPECGRAV 1.015   LEUKOCYTESUR SMALL*   UROBILINOGEN 0.2   BILIRUBINUR Negative   BLOODU Negative   GLUCOSEU Negative       MRI BRAIN WO CONTRAST    (Results Pending)           ASSESSMENT AND PLAN:    Elena Mejia 60-year-old female admitted with headaches CT concerning for possible FL ES    Bifrontal headache: Has history of headaches but different from before. CT head concerning for PRES. patient has elevated blood pressure. Was taking clonidine as was taken off of triamterene/HCTZ.   -We continue clonidine for now as can cause reflexive hypertension  -Continue home valsartan  -Labetalol as needed  -MRI in the morning  -Every 3 hour neurochecks  -Neurology consulted appreciate recommendations  -Percocet 5/325 every 4 as needed for headache  -Keep SBP less then 160     Hypertension:  -Holding home clonidine  -Continue home valsartan  -Labetalol as needed    Anxiety  -Home alprazolam    Sleep Apnea:  CPAP nightly     Depression  -Home fluoxetine, BuSpar    Hypothyroidism   Levothyroxine    Hyperlipidemia  Pravastatin    Elevated ALP:  -GGT      Will discuss with attending physician Dr. Roby Gaffney Status:Full code  FEN: General   PPX: lovenox  DISPO: ARJUN Johnson MD  12/23/2020,  1:58 AM

## 2020-12-23 NOTE — CONSULTS
Neurology Consult Note  Reason for Consult: \"Concern for PRES\"  Chief complaint: PRECIADO  Dr Mary Hall MD asked me to see Rufina Skaggs in consultation for evaluation of PRES. History of Present Illness:  Rufina Skaggs is a 48 y.o. female with a PMHx of morbid obesity, HTN, CKD 3, bradycardia, anxiety, fibromyalgia, migraine, sleep apnea and thyroid disorder who presents with a HA. She had onset of a HA which started last Friday. She initially though it was due to her sinuses. The HA was B/L frontal and occipital and rated a 8/10 pressure-like. She had perioribial pain with maxillary sinus tenderness. When she checked her BP her SBP was in the 180s. The pain did not remit and she ultimately went to an outside ED where the she was note to have a BP of 184/94 and CTH was concerning for PRES. Of note she states that her BP was relatively well controlled when she was taking triamterene/HCTZ and clonidine. However due to worsening CKD, diuretics were discontinued in August and she was started on amlodipine and continued on clonidine. She had peripheral edema so amlodipine was replaced with valsartan (had angioedema with lisinopril). Her BP was well regulated but she feels that in the last few weeks she has been eating more and endorses a 15lbs wt gain and has not been watching the types of food she eats. She continues to have the HA but it is better controled and rated as a 5/10. It is still intermittent with the longest periods lasting ~15hrs. She denies confusion, dizziness, visual symptoms, seizures, tinnitus, hearing loss, N/V, F/C, abdominal pain, constipation/diarrhea, and urinary symptoms.     Medical History:  Past Medical History:   Diagnosis Date    Anxiety     Arthritis     Asthma     Fibromyalgia     Hypertension     Migraine     Primary osteoarthritis of both knees     Sleep apnea     uses CPAP    Thyroid disease      Past Surgical History:   Procedure Laterality Date    CHOLECYSTECTOMY 10/14/2014    Open cholecystectomy and hepatico jejunostomy, estela-en-y    COLONOSCOPY  06/01/2017    polyp    ENDOMETRIAL ABLATION  02/2013    KNEE ARTHROSCOPY Left 2/23/16    lateral menisectomoy chondroplasty synovectomy    LAP BAND      THYROIDECTOMY, PARTIAL      UPPER GASTROINTESTINAL ENDOSCOPY N/A 9/13/2019    EGD BIOPSY performed by Poorniam Barry MD at [address] [place] [address]     Medications Prior to Admission: valsartan (DIOVAN) 160 MG tablet, Take 160 mg by mouth daily  allopurinol (ZYLOPRIM) 100 MG tablet, Take 100 mg by mouth daily  busPIRone (BUSPAR) 15 MG tablet, Take 7.5 mg by mouth daily  pravastatin (PRAVACHOL) 40 MG tablet, 20 mg nightly   topiramate (TOPAMAX) 25 MG tablet, TAKE TWO TABLETS BY MOUTH EVERY MORNING AND TAKE ONE TABLET BY MOUTH IN THE EVENING AS DIRECTED  busPIRone (BUSPAR) 15 MG tablet, Take 15 mg by mouth nightly   acetaminophen (TYLENOL) 500 MG tablet, Take 1,000 mg by mouth daily  cloNIDine (CATAPRES) 0.1 MG tablet, Take 0.1 mg by mouth 2 times daily  Multiple Vitamins-Minerals (THERAPEUTIC MULTIVITAMIN-MINERALS) tablet, Take 1 tablet by mouth daily  Vitamin D (CHOLECALCIFEROL) 1000 UNITS CAPS capsule, Take 1,000 Units by mouth daily. montelukast (SINGULAIR) 10 MG tablet, Take 10 mg by mouth nightly. Fexofenadine HCl (ALLEGRA PO), Take 60 mg by mouth daily as needed   ALPRAZolam (XANAX) 0.25 MG tablet, Take 0.5 mg by mouth daily. cyclobenzaprine (FLEXERIL) 10 MG tablet, Take 5 mg by mouth nightly as needed. fluticasone (FLONASE) 50 MCG/ACT nasal spray, 1 spray by Nasal route daily. vitamin B-12 (CYANOCOBALAMIN) 100 MCG tablet, Take 50 mcg by mouth daily. FLUoxetine HCl (PROZAC PO), Take 80 mg by mouth daily. Levothyroxine Sodium (LEVOXYL PO), Take 50 mcg by mouth daily.  Unknown dose  Allergies   Allergen Reactions    Ace Inhibitors      angioedema    Amlodipine      Excessive urination    Lisinopril Swelling     angioedema    Norvasc [Amlodipine Besylate] conversation   Comprehension intact; follows simple commands  Cranial nerves:   CN2: Visual Fields full w/o extinction on confrontational testing,   CN 3,4,6: extraocular muscles intact,  CN5: facial sensation symmetric   CN7:face symmetric without dysarthria,   CN8: hearing grossly intact  CN9: palate elevated symmetrically  CN11: trap full strength on shoulder shrug  CN12: tongue midline with protrusion  Strength: No prontator drift. Good strength in all 4 extremities   Deep tendon reflexes: normal in all 4 extremities  Sensory: light touch intact in all 4 extremities. No sensory extinction on double simultaneous stimulation  Cerebellar/coordination: finger nose finger normal without ataxia  Tone: normal in all 4 extremities  Gait: normal gait    Motor Exam:   R  L    Deltoid 5  5   Biceps 5 5   Triceps 5 5   Wrist extension  5 5   Interossei 5 5      R  L    Hip flexion  5  5   Hip extension  5 5   Knee flexion  5 5   Knee extension  5 5   Ankle dorsiflexion  5 5   Ankle plantar flexion  5 5       Deep tendon reflexes:    R  L    Biceps  Not performed  Not performed   Triceps  2+ 2+   Brachioradialis  2+ 2+   Patellar  2+ 2+   Achilles  Not performed Not performed   Toes Not performed Not performed       Labs  BMP:   Recent Labs     12/22/20 1902 12/23/20  0617    140   K 3.8 4.0    105   CO2 22 22   BUN 18 18   CREATININE 1.1 1.0   GLUCOSE 115* 159*     CBC:   Recent Labs     12/22/20 1902   WBC 8.6   HGB 14.5   HCT 43.6          LFT's:   Recent Labs     12/22/20 1902   AST 32   ALT 18   BILITOT 0.3   ALKPHOS 160*     Troponin:   Recent Labs     12/22/20 1902   TROPONINI <0.01     BNP: No results for input(s): BNP in the last 72 hours. ABGs: No results for input(s): PHART, KXV4ALT, PO2ART in the last 72 hours. INR: No results for input(s): INR in the last 72 hours.   U/A:  Recent Labs     12/22/20 1902   COLORU Straw   PHUR 6.5   WBCUA 3-5   RBCUA 0-2   BACTERIA Rare*   CLARITYU Clear SPECGRAV 1.015   LEUKOCYTESUR SMALL*   UROBILINOGEN 0.2   BILIRUBINUR Negative   BLOODU Negative   GLUCOSEU Negative       Images:  CT head wo contrast:   Narrative   EXAMINATION:   CT OF THE HEAD WITHOUT CONTRAST  12/22/2020 3:55 pm       TECHNIQUE:   CT of the head was performed without the administration of intravenous   contrast. Dose modulation, iterative reconstruction, and/or weight based   adjustment of the mA/kV was utilized to reduce the radiation dose to as low   as reasonably achievable.       COMPARISON:   None.       HISTORY:   ORDERING SYSTEM PROVIDED HISTORY: headache   TECHNOLOGIST PROVIDED HISTORY:   If patient is on cardiac monitor and/or pulse ox, they may be taken off   cardiac monitor and pulse ox, left on O2 if currently on. All monitors   reattached when patient returns to room. Has a \"code stroke\" or \"stroke alert\" been called? ->No   Reason for exam:->headache   Is the patient pregnant?->No   Reason for Exam: Hypertension (pt c/o htn, states took extra medication but   it was still elevated, highest 183/115, also c/o headache       FINDINGS:   BRAIN/VENTRICLES: There is suggestion of hypodensity identified within the   occipital lobes bilaterally, greater on the left, possibly with loss of the   gray-white matter differentiation on the left.  This is best visualized axial   image 24 and sagittal image 60.       Otherwise, the gray-white matter differentiation is maintained.  No   hemorrhages are seen within the brain parenchyma.  No midline shift.    Ventricles are unremarkable.  Intracranial vasculature is unremarkable.       ORBITS: The visualized portion of the orbits demonstrate no acute abnormality.       SINUSES: The visualized paranasal sinuses and mastoid air cells demonstrate   no acute abnormality.       SOFT TISSUES/SKULL:  No acute abnormality of the visualized skull or soft   tissues.           Impression   Suggestion of hypodensity within the occipital lobes bilaterally, greater on   the left.  While this could be artifactual, it also raises the possibility of   ischemia or of posterior reversible encephalopathy syndrome.  Given the   patient's history and given these findings, further evaluation with MRI is   recommended.       Findings were discussed with Sylvia High at 7:09 pm on 12/22/2020.           MRI wo contrast:   Narrative   EXAM: MRI BRAIN WO CONTRAST       INDICATION: PRES       COMPARISON: Head CT dated December 22, 2020       TECHNIQUE: Standard per department protocol without contrast.       FINDINGS:       No diffusion restriction.        No evidence of acute intracranial hemorrhage.        The brain parenchyma is normal in signal.       The ventricles and extra-axial spaces are normal in size and configuration.       The midline structures including the pituitary gland, optic chiasm, corpus callosum, brainstem, pineal gland, and cerebellar tonsils are normal.        The paranasal sinuses are clear.       The globes and orbits appear normal.        The intracranial arterial and venous flow-voids are normal.       No skull base or calvarial abnormality.           Impression       Normal.       Assessment:  Ralph Gonzalez is a 48 y.o. female who presented with HA and CTH was concerning for PRES. Her PMHx is significant for HTN and CKD 3 with multiple allergies to antihypertensives. She noted that her recent symptoms occurred in the setting of elevated BPs recently with changes in her antihypertensives and recent weight gain due to dietary noncompliance. MRI today was unremarkable and did not indicated PRES. Treatment will be oriented towards better BP control.     Plan:  - keep SBP <160 with goal of ~125/80 (pt's historical stable BP)  - neurochecks q4hr  - continue valsartan (160mg qD) and clonidine (0.1mg BID)  - continue labetalol PRN  - may consider increasing antihypertensives given pt continues to require PRN labetalol and goal of ~125/80     The patient will be discussed with Dr Costa Phillips.   A copy of this note was provided for MD Guzman Sanon MD  PGY-3  12/23/20  12:33 PM

## 2020-12-23 NOTE — PROGRESS NOTES
Patient discharged via wheelchair with all belongings. IV removed. Discharge instructions reviewed, all questions answered.

## 2020-12-24 NOTE — ED PROVIDER NOTES
I independently interviewed, examined and evaluated Cliff Puri. In brief, the patient is a 41-year-old female presenting with headache. She has been having issues with her blood pressure recently, recently had valsartan increased from primary care a few days ago. Blood pressures have been running in the 180s at home. She has a frontal headache. Denies vision changes, numbness or weakness, speech changes, or new gait issues. She states she was taken hydrochlorothiazide for her blood pressure due to renal issues and since then her blood pressure has been uncontrolled. On exam, patient appears nontoxic. Strength 5/5 in UE and LE bilaterally. Sensation intact x 4. No aphasia or dysarthria. CN 2-12 intact. No facial droop. No limb or gait ataxia. The Ekg interpreted by me shows  sinus bradycardia, rate=59   Axis is   Normal  QTc is  within an acceptable range  Intervals and Durations are unremarkable. ST Segments: no acute change  No significant change from prior EKG dated 8/22/20    ED course: Patient presents with headache. Blood pressure is initially hypertensive at 184/94 which is consistent with with the patient has for her blood pressures at home. Work-up is obtained. Blood work unremarkable. CT head was performed due to headache, and does show potential concern for PRES. patient is neurologically intact. She is given a dose of labetalol here. Blood pressures improved spontaneously and somewhat after of labetalol, I did not want to lower it further so I held off on Cardene drip. Although this may be artifactual on CT, patient will require further evaluation by neurology and MRI. Therefore she is transferred to [facility]. All diagnostic, treatment, and disposition decisions were made by myself in conjunction with the advanced practice provider. For all further details of the patient's emergency department visit, please see the advanced practice provider's documentation.     Comment:

## 2020-12-28 ENCOUNTER — NURSE ONLY (OUTPATIENT)
Dept: ORTHOPEDIC SURGERY | Age: 55
End: 2020-12-28
Payer: MEDICARE

## 2020-12-28 VITALS — WEIGHT: 293 LBS | HEIGHT: 68 IN | BODY MASS INDEX: 44.41 KG/M2

## 2020-12-28 NOTE — PROGRESS NOTES
Chief Complaint   Patient presents with    Injections     BILATERAL KNEE VISCO 3 #2     Dx: Osteoarthritis right and left knee      The patient returns today for their second right and left knee Visco supplementation injection. The risks, benefits, and complications of the injections were again discussed in detail with the patient. The risks discussed included but are not limited to infection, skin reactions, hot swollen joints, and anaphylaxis. The patient gave verbal informed consent for the injection. The patient skin was prepped with 3 sterile gauze pads soaked with alcohol solution and the knee joint was injected with 2cc Visco 3 intra-articularly under sterile conditions . Technique: Under sterile conditions a [employer] ultrasound unit with a variable frequency (6.0-15.0 MHz) linear transducer was used to localize the placement of a 22-gauge needle into the right and left knee joint. Findings: Successful needle placement for intra-articular Visco supplementation injection. Final images were taken and saved for permanent record. The patient tolerated the injection reasonably well. The patient was given instructions to ice the the knee and avoid strenuous activities for 24-48 hours. The patient was instructed to call the office immediately if there is increased pain, redness, warmth, fever, or chills. We will see the patient back in one week for the third injection. Arnegard Erika, [address]    This dictation was performed with a verbal recognition program [facility]) and it was checked for errors. It is possible that there are still dictated errors within this office note. If so, please bring any errors to my attention for an addendum. All efforts were made to ensure that this office note is accurate.

## 2021-01-04 ENCOUNTER — NURSE ONLY (OUTPATIENT)
Dept: ORTHOPEDIC SURGERY | Age: 56
End: 2021-01-04
Payer: MEDICARE

## 2021-01-04 DIAGNOSIS — M17.0 PRIMARY OSTEOARTHRITIS OF BOTH KNEES: Primary | ICD-10-CM

## 2021-01-04 PROCEDURE — 20611 DRAIN/INJ JOINT/BURSA W/US: CPT | Performed by: PHYSICIAN ASSISTANT

## 2021-01-04 RX ORDER — HYALURONATE SODIUM 10 MG/ML
40 SYRINGE (ML) INTRAARTICULAR ONCE
Status: COMPLETED | OUTPATIENT
Start: 2021-01-04 | End: 2021-01-04

## 2021-01-04 RX ADMIN — Medication 40 MG: at 12:47

## 2021-01-04 NOTE — PROGRESS NOTES
Chief Complaint   Patient presents with    Injections     #3 VISCO-3 ZAIRA KNEES     Dx: Osteoarthritis right and left knee      The patient returns today for their third and final right and left knee Visco supplementation injection. The risks, benefits, and complications of the injections were again discussed in detail with the patient. The risks discussed include but are not limited to infection, skin reactions, hot swollen joints, and anaphylaxis. The patient gave verbal informed consent for the injection. The patient's skin was prepped with 3 sterile gauze pads soaked with alcohol solution and the knee joint was injected with 2cc Visco 3 intra-articularly under sterile conditions. Technique: Under sterile conditions a [employer] ultrasound unit with a variable frequency (6.0-15.0 MHz) linear transducer was used to localize the placement of a 22-gauge needle into the right and left knee joint. Findings: Successful needle placement for intra-articular Visco supplementation injection. Final images were taken and saved for permanent record. The patient tolerated the injection reasonably well. The patient was given instructions to ice of the knee and avoid strenuous activity for 24-48 hours. The patient was instructed to call the office immediately if there is increased pain, redness, warmth, fever, or chills. We will see the patient back on an as-needed basis  from this point. Leticia Warner, [facility]    This dictation was performed with a verbal recognition program [facility]) and it was checked for errors. It is possible that there are still dictated errors within this office note. If so, please bring any errors to my attention for an addendum. All efforts were made to ensure that this office note is accurate.

## 2021-08-26 ENCOUNTER — HOSPITAL ENCOUNTER (OUTPATIENT)
Dept: WOMENS IMAGING | Age: 56
Discharge: HOME | End: 2021-08-26
Payer: MEDICARE

## 2021-08-26 DIAGNOSIS — Z12.31 BREAST CANCER SCREENING BY MAMMOGRAM: ICD-10-CM

## 2021-08-26 PROCEDURE — 77067 SCR MAMMO BI INCL CAD: CPT

## 2022-08-02 RX ORDER — SODIUM CHLORIDE 0.9 % (FLUSH) 0.9 %
5-40 SYRINGE (ML) INJECTION PRN
Status: CANCELLED | OUTPATIENT
Start: 2022-08-02

## 2022-08-02 RX ORDER — SODIUM CHLORIDE 9 MG/ML
INJECTION, SOLUTION INTRAVENOUS PRN
Status: CANCELLED | OUTPATIENT
Start: 2022-08-02

## 2022-08-02 RX ORDER — SODIUM CHLORIDE 0.9 % (FLUSH) 0.9 %
5-40 SYRINGE (ML) INJECTION EVERY 12 HOURS SCHEDULED
Status: CANCELLED | OUTPATIENT
Start: 2022-08-02

## 2022-08-05 ENCOUNTER — APPOINTMENT (OUTPATIENT)
Dept: ULTRASOUND IMAGING | Age: 57
End: 2022-08-05
Payer: MEDICARE

## 2022-08-08 ENCOUNTER — HOSPITAL ENCOUNTER (OUTPATIENT)
Dept: ULTRASOUND IMAGING | Age: 57
Discharge: HOME | End: 2022-08-08
Payer: MEDICARE

## 2022-08-08 VITALS
BODY MASS INDEX: 44.41 KG/M2 | DIASTOLIC BLOOD PRESSURE: 71 MMHG | HEART RATE: 56 BPM | HEIGHT: 68 IN | OXYGEN SATURATION: 94 % | RESPIRATION RATE: 14 BRPM | TEMPERATURE: 97 F | SYSTOLIC BLOOD PRESSURE: 125 MMHG | WEIGHT: 293 LBS

## 2022-08-08 DIAGNOSIS — R79.89 ABNORMAL LFTS: ICD-10-CM

## 2022-08-08 LAB
INR BLD: 1.09 (ref 0.87–1.14)
PLATELET # BLD: 233 K/UL (ref 135–450)
PROTHROMBIN TIME: 13.9 SEC (ref 11.7–14.5)

## 2022-08-08 PROCEDURE — 88313 SPECIAL STAINS GROUP 2: CPT

## 2022-08-08 PROCEDURE — 85610 PROTHROMBIN TIME: CPT

## 2022-08-08 PROCEDURE — 36415 COLL VENOUS BLD VENIPUNCTURE: CPT

## 2022-08-08 PROCEDURE — 85049 AUTOMATED PLATELET COUNT: CPT

## 2022-08-08 PROCEDURE — 88307 TISSUE EXAM BY PATHOLOGIST: CPT

## 2022-08-08 PROCEDURE — 7100000011 HC PHASE II RECOVERY - ADDTL 15 MIN

## 2022-08-08 PROCEDURE — 6360000002 HC RX W HCPCS: Performed by: STUDENT IN AN ORGANIZED HEALTH CARE EDUCATION/TRAINING PROGRAM

## 2022-08-08 PROCEDURE — 47000 NEEDLE BIOPSY OF LIVER PERQ: CPT | Performed by: INTERNAL MEDICINE

## 2022-08-08 PROCEDURE — 7100000010 HC PHASE II RECOVERY - FIRST 15 MIN

## 2022-08-08 RX ORDER — FENTANYL CITRATE 50 UG/ML
INJECTION, SOLUTION INTRAMUSCULAR; INTRAVENOUS
Status: COMPLETED | OUTPATIENT
Start: 2022-08-08 | End: 2022-08-08

## 2022-08-08 RX ORDER — SODIUM CHLORIDE 0.9 % (FLUSH) 0.9 %
5-40 SYRINGE (ML) INJECTION PRN
Status: DISCONTINUED | OUTPATIENT
Start: 2022-08-08 | End: 2022-08-09 | Stop reason: HOSPADM

## 2022-08-08 RX ORDER — ONDANSETRON 2 MG/ML
4 INJECTION INTRAMUSCULAR; INTRAVENOUS EVERY 8 HOURS PRN
Status: DISCONTINUED | OUTPATIENT
Start: 2022-08-08 | End: 2022-08-09 | Stop reason: HOSPADM

## 2022-08-08 RX ORDER — OXYCODONE HYDROCHLORIDE AND ACETAMINOPHEN 5; 325 MG/1; MG/1
1 TABLET ORAL EVERY 4 HOURS PRN
Status: DISCONTINUED | OUTPATIENT
Start: 2022-08-08 | End: 2022-08-09 | Stop reason: HOSPADM

## 2022-08-08 RX ORDER — OXYCODONE HYDROCHLORIDE AND ACETAMINOPHEN 5; 325 MG/1; MG/1
2 TABLET ORAL EVERY 4 HOURS PRN
Status: DISCONTINUED | OUTPATIENT
Start: 2022-08-08 | End: 2022-08-09 | Stop reason: HOSPADM

## 2022-08-08 RX ORDER — ACETAMINOPHEN 325 MG/1
650 TABLET ORAL EVERY 4 HOURS PRN
Status: DISCONTINUED | OUTPATIENT
Start: 2022-08-08 | End: 2022-08-09 | Stop reason: HOSPADM

## 2022-08-08 RX ORDER — MIDAZOLAM HYDROCHLORIDE 1 MG/ML
INJECTION INTRAMUSCULAR; INTRAVENOUS
Status: COMPLETED | OUTPATIENT
Start: 2022-08-08 | End: 2022-08-08

## 2022-08-08 RX ORDER — SODIUM CHLORIDE 9 MG/ML
INJECTION, SOLUTION INTRAVENOUS PRN
Status: DISCONTINUED | OUTPATIENT
Start: 2022-08-08 | End: 2022-08-09 | Stop reason: HOSPADM

## 2022-08-08 RX ORDER — SODIUM CHLORIDE 0.9 % (FLUSH) 0.9 %
5-40 SYRINGE (ML) INJECTION EVERY 12 HOURS SCHEDULED
Status: DISCONTINUED | OUTPATIENT
Start: 2022-08-08 | End: 2022-08-09 | Stop reason: HOSPADM

## 2022-08-08 RX ADMIN — FENTANYL CITRATE 50 MCG: 50 INJECTION INTRAMUSCULAR; INTRAVENOUS at 10:42

## 2022-08-08 RX ADMIN — FENTANYL CITRATE 50 MCG: 50 INJECTION INTRAMUSCULAR; INTRAVENOUS at 10:31

## 2022-08-08 RX ADMIN — MIDAZOLAM 1 MG: 1 INJECTION INTRAMUSCULAR; INTRAVENOUS at 10:31

## 2022-08-08 ASSESSMENT — PAIN - FUNCTIONAL ASSESSMENT: PAIN_FUNCTIONAL_ASSESSMENT: NONE - DENIES PAIN

## 2022-08-08 NOTE — PROGRESS NOTES
Pt awake and resting comfortably in stretcher. Scant amount of blood to bandage, no change in assessment.

## 2022-08-08 NOTE — PROGRESS NOTES
Pt arrived for image guided liver biopsy. Procedure explained including the risk and benefits of the procedure. All questions answered. Pt verbalizes understanding of the of procedure and states no more questions. Consent signed. Vital signs stable, labs, allergies, medications, and code status reviewed. No contraindications noted. 3 core specimens taken and given to pathology.       Allergies  Ace inhibitors, Amlodipine, Lisinopril, Norvasc [amlodipine besylate], and Verapamil    Labs  Lab Results   Component Value Date    INR 1.09 08/08/2022    PROTIME 13.9 08/08/2022       Lab Results   Component Value Date    CREATININE 1.0 12/23/2020    BUN 18 12/23/2020    GFR >60 04/10/2012     12/23/2020    K 4.0 12/23/2020     12/23/2020    CO2 22 12/23/2020       Lab Results   Component Value Date    WBC 8.6 12/22/2020    HGB 14.5 12/22/2020    HCT 43.6 12/22/2020    MCV 83.5 12/22/2020     08/08/2022

## 2022-08-08 NOTE — PRE SEDATION
Sedation Pre-Procedure Note    Patient Name: Daija Rodriguez   YOB: 1965  Room/Bed: Room/bed info not found  Medical Record Number: [id number]  Date: 8/8/2022   Time: 10:20 AM       Indication:  Elevated LFTS    Consent: I have discussed with the patient and/or the patient representative the indication, alternatives, and the possible risks and/or complications of the planned procedure and the anesthesia methods. The patient and/or patient representative appear to understand and agree to proceed. Vital Signs:   Vitals:    08/08/22 0831   BP: (!) 142/78   Pulse: 61   Resp: 18   Temp: 97.1 °F (36.2 °C)   SpO2: 97%       Past Medical History:   has a past medical history of Anxiety, Arthritis, Asthma, Fibromyalgia, Hypertension, Migraine, Primary osteoarthritis of both knees, Sleep apnea, and Thyroid disease. Past Surgical History:   has a past surgical history that includes Thyroidectomy, partial; Lap Band; Endometrial ablation (02/2013); Cholecystectomy (10/14/2014); Knee arthroscopy (Left, 2/23/16); Colonoscopy (06/01/2017); and Upper gastrointestinal endoscopy (N/A, 9/13/2019). Medications:   Scheduled Meds:    sodium chloride flush  5-40 mL IntraVENous 2 times per day     Continuous Infusions:    sodium chloride       PRN Meds: sodium chloride flush, sodium chloride  Home Meds:   Prior to Admission medications    Medication Sig Start Date End Date Taking?  Authorizing Provider   valsartan (DIOVAN) 160 MG tablet Take 160 mg by mouth daily    Historical Provider, MD   allopurinol (ZYLOPRIM) 100 MG tablet Take 100 mg by mouth daily    Historical Provider, MD   busPIRone (BUSPAR) 15 MG tablet Take 7.5 mg by mouth daily    Historical Provider, MD   pravastatin (PRAVACHOL) 40 MG tablet 20 mg nightly  12/21/20   Historical Provider, MD   topiramate (TOPAMAX) 25 MG tablet TAKE TWO TABLETS BY MOUTH EVERY MORNING AND TAKE ONE TABLET BY MOUTH IN THE EVENING AS DIRECTED 11/3/20   Historical Provider, MD busPIRone (BUSPAR) 15 MG tablet Take 15 mg by mouth nightly     Historical Provider, MD   acetaminophen (TYLENOL) 500 MG tablet Take 1,000 mg by mouth daily    Historical Provider, MD   cloNIDine (CATAPRES) 0.1 MG tablet Take 0.1 mg by mouth 2 times daily    Historical Provider, MD   Multiple Vitamins-Minerals (THERAPEUTIC MULTIVITAMIN-MINERALS) tablet Take 1 tablet by mouth daily    Historical Provider, MD   Vitamin D (CHOLECALCIFEROL) 1000 UNITS CAPS capsule Take 1,000 Units by mouth daily. Historical Provider, MD   montelukast (SINGULAIR) 10 MG tablet Take 10 mg by mouth nightly. Historical Provider, MD   Fexofenadine HCl (ALLEGRA PO) Take 60 mg by mouth daily as needed     Historical Provider, MD   ALPRAZolam (XANAX) 0.25 MG tablet Take 0.5 mg by mouth daily. Historical Provider, MD   cyclobenzaprine (FLEXERIL) 10 MG tablet Take 5 mg by mouth nightly as needed. Historical Provider, MD   fluticasone (FLONASE) 50 MCG/ACT nasal spray 1 spray by Nasal route daily. Patient not taking: Reported on 8/8/2022    Historical Provider, MD   vitamin B-12 (CYANOCOBALAMIN) 100 MCG tablet Take 50 mcg by mouth daily. Historical Provider, MD   FLUoxetine HCl (PROZAC PO) Take 80 mg by mouth daily. Historical Provider, MD   Levothyroxine Sodium (LEVOXYL PO) Take 50 mcg by mouth daily. Unknown dose    Historical Provider, MD     Coumadin Use Last 7 Days:  no  Antiplatelet drug therapy use last 7 days: no  Other anticoagulant use last 7 days: no  Additional Medication Information:  N/A      Pre-Sedation Documentation and Exam:   I have personally completed a history, physical exam & review of systems for this patient (see notes). PHYSICAL EXAM:  BP (!) 142/78   Pulse 61   Temp 97.1 °F (36.2 °C) (Temporal)   Resp 18   Ht 5' 8\" (1.727 m)   Wt 295 lb (133.8 kg)   LMP 08/26/2019   SpO2 97%   BMI 44.85 kg/m²    Alert and oriented in no acute distress. Head/Neck: Extraocular muscles intact.  Pupils equal, round, reactive. Neurologic: No gross defects. Cardiac: Regular rate and rhythm. Lungs: Non labored respirations.    Abdomen: Normal.      Mallampati Airway Assessment:  normal, Mallampati Class II - (soft palate, fauces & uvula are visible)    Prior History of Anesthesia Complications:   none    ASA Classification:  Class 2 - A normal healthy patient with mild systemic disease    Sedation/ Anesthesia Plan:   intravenous sedation    Medications Planned:   midazolam (Versed) intravenously and fentanyl intravenously    Patient is an appropriate candidate for plan of sedation: yes    Electronically signed by Rajesh Bai DO on 8/8/2022 at 10:20 AM

## 2022-08-29 ENCOUNTER — TELEPHONE (OUTPATIENT)
Dept: MAMMOGRAPHY | Age: 57
End: 2022-08-29

## 2022-08-29 ENCOUNTER — HOSPITAL ENCOUNTER (OUTPATIENT)
Dept: MAMMOGRAPHY | Age: 57
Discharge: HOME | End: 2022-08-29
Payer: MEDICARE

## 2022-08-29 DIAGNOSIS — Z12.39 SCREENING BREAST EXAMINATION: ICD-10-CM

## 2022-08-29 PROCEDURE — 77067 SCR MAMMO BI INCL CAD: CPT

## 2023-09-18 NOTE — PLAN OF CARE
Problem: HEMODYNAMIC STATUS  Goal: Patient has stable vital signs and fluid balance  VSS but BP is elevated. Labetalol given PRN. 12/23/2020 0804 by Sadie Arriola RN  Outcome: Ongoing       Problem: Falls - Risk of:  Goal: Will remain free from falls  Description: Fall precautions in place. Bed is in lowest position, wheels locked, alarm on, non-skid socks on. Call light and bedside table within reach. Patient calls out appropriately. Patient is up x1 assist, has a steady gait. Will continue to assess and monitor.     Outcome: Ongoing Complex Repair And Melolabial Flap Text: The defect edges were debeveled with a #15 scalpel blade.  The primary defect was closed partially with a complex linear closure.  Given the location of the remaining defect, shape of the defect and the proximity to free margins a melolabial flap was deemed most appropriate for complete closure of the defect.  Using a sterile surgical marker, an appropriate advancement flap was drawn incorporating the defect and placing the expected incisions within the relaxed skin tension lines where possible.    The area thus outlined was incised deep to adipose tissue with a #15 scalpel blade.  The skin margins were undermined to an appropriate distance in all directions utilizing iris scissors.

## 2023-11-24 NOTE — DISCHARGE SUMMARY
Hospital Medicine Discharge Summary    Patient ID: Hina Hung   Gender: female  : 1965   Age: 48 y.o. MRN: [id number]  Code Status: Full Code    Patient's PCP: DON Rouse - CNP    Admit Date: 2020     Discharge Date:   2020    Admitting Physician: Nica Cordero MD     Discharge Physician: Dave Gibbons MD     Discharge Diagnoses:  - Ocular Migraine  - HTN  - Anxiety  - Sleep Apnea  - Depression  - Hypothyroidism  - Hyperlipidemia       Active Hospital Problems    Diagnosis Date Noted    Headache [R51.9] 2020       The patient was seen and examined on day of discharge and this discharge summary is in conjunction with any daily progress note from day of discharge. Hospital Course:   Joelle King is a 55-year-old female with history of anxiety, asthma, fibromyalgia, hypertension, migraine, sleep apnea, hypothyroidism being transferred from an outside hospital.  Patient stated that she had started to have a headache for the last several days. Chely Oconnor recently went to her PCP noted that her blood pressure was elevated over normal.  They increased her dose of valsartan on .  The patient has been taking her blood pressure at home and stated highest blood pressures were in the 180s.  Normally her blood pressure is around the 120s while on her medications.  She stated that she was feeling slightly dizzy with frontal headache.  CT head was done that was concerning for PRES. the patient was also taking Catapres that was started several months ago as the patient was taken off of her Triamterene/HCTZ due to poor renal function, and stopped on ACE due to angioedema.     BP on admission 160s  She was transferred here for neuro neurological evaluation, and MRI. Pt states that she has pressure in her sinuses, which are tender to palpation. Reports that her BP was well controlled with the diuretic medication but it was stopped in August due to pt's renal damage.  Her blood Onset: 11/15/2023    Location / description: Would like a Tuesday appointment for right knee pain, swelling, \"hurts very bad when walking\" No fall, hx chronic knee pain, dragging foot at times    Precipitating Factors: no injury    Pain Scale (1-10), 10 highest: 10 10/10 with walking     What improves/worsens symptoms: green alcohol, Asprin cream, knee band    Symptom specific medications: aleve in am 1 tab    LMP : Patient's last menstrual period was 06/01/1993.    Are you pregnant or breast feeding: not applicable    Recent Care: 10/23/2023 Office visit partial mastectomy    PLAN:    See Provider within next few days       Patient/Caller agrees to follow recommendations.   Secure chat to office, while waiting for response, caller advises office is calling her direct to set up appointment.         Reason for Disposition  • MODERATE pain (e.g., symptoms interfere with work or school, limping) and present > 3 days    Protocols used: KNEE PAIN-A-OH       pressures became higher towards the end of November and she thinks that her higher pressures might be associated with gaining 15lb at that time. Notes no notable changes in vision but states that she wears glasses and her vision has been getting worse over some time now. Noted one episode of diarrhea earlier this morning and some chills. Denies feeling dizzy today and had no issues ambulating to the bathroom or with balance but states that she does get dizzy from time to time. Also reports arthritis in the knees, more pronounced on the right that sometimes affects her gate, but does not lose balance. The Neurology team evaluated the pt and an MRI did not show any abnormalities. On day of discharge pt is stable and resting comfortably in bed without any complaints. Oxygenating well on room air. Denies chest pain, shortness of breath, nausea, vomiting, fevers. Disposition:  Home    Physical Exam Performed:     BP (!) 149/75   Pulse 65   Temp 98.2 °F (36.8 °C) (Oral)   Resp 16   Ht 5' 8\" (1.727 m)   Wt (!) 304 lb 14.3 oz (138.3 kg)   LMP 08/26/2019   SpO2 96%   BMI 46.36 kg/m²       General appearance:  No apparent distress, appears stated age and cooperative. HEENT:  Normal cephalic, atraumatic without obvious deformity. Pupils equal, round, and reactive to light. Extra ocular muscles intact. Conjunctivae/corneas clear. Neck: Supple, with full range of motion. No jugular venous distention. Trachea midline. Respiratory:  Normal respiratory effort. Clear to auscultation, bilaterally without Rales/Wheezes/Rhonchi. Cardiovascular:  Regular rate and rhythm with normal S1/S2 without murmurs, rubs or gallops. Abdomen: Soft, non-tender, non-distended with normal bowel sounds. Musculoskeletal:  No clubbing, cyanosis or edema bilaterally. Full range of motion without deformity. Skin: Skin color, texture, turgor normal.  No rashes or lesions. Neurologic:  No sensory or motor deficits.  Negative finger to nose test. Gait intact. No pronator drift. Neurovascularly intact without any focal sensory/motor deficits. Cranial nerves: II-XII intact, grossly non-focal.  Psychiatric:  Alert and oriented, thought content appropriate, normal insight  Capillary Refill: Brisk,< 3 seconds   Peripheral Pulses: +2 palpable, equal bilaterally       Labs: For convenience and continuity at follow-up the following most recent labs are provided:      CBC:    Lab Results   Component Value Date    WBC 8.6 12/22/2020    HGB 14.5 12/22/2020    HCT 43.6 12/22/2020     12/22/2020       Renal:    Lab Results   Component Value Date     12/23/2020    K 4.0 12/23/2020     12/23/2020    CO2 22 12/23/2020    BUN 18 12/23/2020    CREATININE 1.0 12/23/2020    CALCIUM 9.6 12/23/2020         Significant Diagnostic Studies    Radiology:   MRI BRAIN WO CONTRAST   Final Result      Normal.                Consults:     IP CONSULT TO NEUROLOGY    Disposition:  Home     Condition at Discharge: Stable    Discharge Instructions/Follow-up:  Please make an appointment with your PCP as soon as possible for the hospital visit follow up.     Code Status:  Full Code     Activity: activity as tolerated    Diet: regular diet      Discharge Medications:     Current Discharge Medication List           Details   valsartan (DIOVAN) 160 MG tablet Take 160 mg by mouth daily      allopurinol (ZYLOPRIM) 100 MG tablet Take 100 mg by mouth daily      !! busPIRone (BUSPAR) 15 MG tablet Take 7.5 mg by mouth daily      pravastatin (PRAVACHOL) 40 MG tablet 20 mg nightly       topiramate (TOPAMAX) 25 MG tablet TAKE TWO TABLETS BY MOUTH EVERY MORNING AND TAKE ONE TABLET BY MOUTH IN THE EVENING AS DIRECTED      !! busPIRone (BUSPAR) 15 MG tablet Take 15 mg by mouth nightly       acetaminophen (TYLENOL) 500 MG tablet Take 1,000 mg by mouth daily      cloNIDine (CATAPRES) 0.1 MG tablet Take 0.1 mg by mouth 2 times daily      Multiple Vitamins-Minerals (THERAPEUTIC MULTIVITAMIN-MINERALS) tablet Take 1 tablet by mouth daily      Vitamin D (CHOLECALCIFEROL) 1000 UNITS CAPS capsule Take 1,000 Units by mouth daily. montelukast (SINGULAIR) 10 MG tablet Take 10 mg by mouth nightly. Associated Diagnoses: HTN (hypertension); Angioedema      Fexofenadine HCl (ALLEGRA PO) Take 60 mg by mouth daily as needed       ALPRAZolam (XANAX) 0.25 MG tablet Take 0.5 mg by mouth daily. cyclobenzaprine (FLEXERIL) 10 MG tablet Take 5 mg by mouth nightly as needed. fluticasone (FLONASE) 50 MCG/ACT nasal spray 1 spray by Nasal route daily. vitamin B-12 (CYANOCOBALAMIN) 100 MCG tablet Take 50 mcg by mouth daily. FLUoxetine HCl (PROZAC PO) Take 80 mg by mouth daily. Levothyroxine Sodium (LEVOXYL PO) Take 50 mcg by mouth daily. Unknown dose       !! - Potential duplicate medications found. Please discuss with provider. Time Spent on discharge is more than 45 minutes in the examination, evaluation, counseling and review of medications and discharge plan. Signed:    Dottie Levy MD   12/23/2020      Thank you DON Chappell - CELE[facility] for the opportunity to be involved in this patient's care. Patient seen and examined, labs and imaging studies reviewed, agree with assessment and plan as outlined above. Continue with dc planning asked to monitor for recurrence of symptoms or new symptoms including but not limited to chest pain shortness of breath nausea vomiting fevers or chills and seek immediate medical attention or call 911.   Late entry on 12/30  Salvatore Gonzalez MD [facility]

## 2024-01-18 ENCOUNTER — HOSPITAL ENCOUNTER (OUTPATIENT)
Dept: WOMENS IMAGING | Age: 59
Discharge: HOME | End: 2024-01-18
Payer: MEDICARE

## 2024-01-18 VITALS — WEIGHT: 260 LBS | HEIGHT: 68 IN | BODY MASS INDEX: 39.4 KG/M2

## 2024-01-18 DIAGNOSIS — Z12.31 ENCOUNTER FOR SCREENING MAMMOGRAM FOR MALIGNANT NEOPLASM OF BREAST: ICD-10-CM

## 2024-01-18 PROCEDURE — 77063 BREAST TOMOSYNTHESIS BI: CPT

## 2024-05-20 NOTE — PATIENT INSTRUCTIONS
Patient was provided with instructions regarding their diagnosis and treatment today. They voiced understanding of the treatment plan and were instructed on when to return to the clinic. n/a

## (undated) DEVICE — BW-412T DISP COMBO CLEANING BRUSH: Brand: SINGLE USE COMBINATION CLEANING BRUSH

## (undated) DEVICE — MOUTHPIECE ENDOSCP L CTRL OPN AND SIDE PORTS DISP

## (undated) DEVICE — SET VLV 3 PC AWS DISPOSABLE GRDIAN SCOPEVALET

## (undated) DEVICE — SOLUTION IV IRRIG WATER 500ML POUR BRL ST 2F7113

## (undated) DEVICE — PROCEDURE KIT ENDOSCP CUST

## (undated) DEVICE — FORCEPS BX L240CM WRK CHN 2.8MM STD CAP W/ NDL MIC MESH